# Patient Record
Sex: MALE | Race: WHITE | Employment: OTHER | URBAN - NONMETROPOLITAN AREA
[De-identification: names, ages, dates, MRNs, and addresses within clinical notes are randomized per-mention and may not be internally consistent; named-entity substitution may affect disease eponyms.]

---

## 2017-01-03 ENCOUNTER — HOSPITAL ENCOUNTER (OUTPATIENT)
Dept: GENERAL RADIOLOGY | Age: 77
Discharge: HOME | End: 2017-01-03
Attending: EMERGENCY MEDICINE | Admitting: EMERGENCY MEDICINE

## 2017-01-03 DIAGNOSIS — J40 BRONCHITIS, NOT SPECIFIED AS ACUTE OR CHRONIC: ICD-10-CM

## 2017-07-10 ENCOUNTER — HOSPITAL ENCOUNTER (OUTPATIENT)
Dept: SURGERY | Age: 77
Discharge: HOME | End: 2017-07-10
Attending: INTERNAL MEDICINE | Admitting: INTERNAL MEDICINE

## 2017-07-10 VITALS
DIASTOLIC BLOOD PRESSURE: 73 MMHG | TEMPERATURE: 98.6 F | RESPIRATION RATE: 16 BRPM | SYSTOLIC BLOOD PRESSURE: 121 MMHG | HEIGHT: 70 IN | OXYGEN SATURATION: 99 % | HEART RATE: 69 BPM | BODY MASS INDEX: 27.2 KG/M2 | WEIGHT: 190 LBS

## 2017-07-10 RX ORDER — SODIUM CHLORIDE, SODIUM LACTATE, POTASSIUM CHLORIDE, CALCIUM CHLORIDE 600; 310; 30; 20 MG/100ML; MG/100ML; MG/100ML; MG/100ML
INJECTION, SOLUTION INTRAVENOUS CONTINUOUS
Status: DISCONTINUED | OUTPATIENT
Start: 2017-07-10 | End: 2017-07-11 | Stop reason: HOSPADM

## 2017-07-10 RX ADMIN — SODIUM CHLORIDE, SODIUM LACTATE, POTASSIUM CHLORIDE, CALCIUM CHLORIDE: 600; 310; 30; 20 INJECTION, SOLUTION INTRAVENOUS at 08:04

## 2017-07-10 ASSESSMENT — PAIN - FUNCTIONAL ASSESSMENT: PAIN_FUNCTIONAL_ASSESSMENT: 0-10

## 2017-10-14 ENCOUNTER — HOSPITAL ENCOUNTER (OUTPATIENT)
Dept: LAB | Age: 77
Discharge: HOME | End: 2017-10-14
Attending: NURSE PRACTITIONER | Admitting: NURSE PRACTITIONER

## 2017-10-14 LAB
ALT SERPL-CCNC: 24 U/L (ref 10–40)
ANION GAP SERPL CALCULATED.3IONS-SCNC: 13 MMOL/L (ref 4–16)
BASOPHILS ABSOLUTE: 0.1 K/CU MM
BASOPHILS RELATIVE PERCENT: 0.9 % (ref 0–1)
BUN BLDV-MCNC: 28 MG/DL (ref 6–23)
CALCIUM SERPL-MCNC: 9.8 MG/DL (ref 8.3–10.6)
CHLORIDE BLD-SCNC: 102 MMOL/L (ref 99–110)
CHOLESTEROL: 206 MG/DL
CO2: 26 MMOL/L (ref 21–32)
CREAT SERPL-MCNC: 1.1 MG/DL (ref 0.9–1.3)
DIFFERENTIAL TYPE: ABNORMAL
EOSINOPHILS ABSOLUTE: 0.3 K/CU MM
EOSINOPHILS RELATIVE PERCENT: 5.7 % (ref 0–3)
GFR AFRICAN AMERICAN: >60 ML/MIN/1.73M2
GFR NON-AFRICAN AMERICAN: >60 ML/MIN/1.73M2
GLUCOSE BLD-MCNC: 111 MG/DL (ref 70–140)
HCT VFR BLD CALC: 43.7 % (ref 42–52)
HDLC SERPL-MCNC: 30 MG/DL
HEMOGLOBIN: 14 GM/DL (ref 13.5–18)
IMMATURE NEUTROPHIL %: 0.5 % (ref 0–0.43)
LDL CHOLESTEROL CALCULATED: ABNORMAL MG/DL
LYMPHOCYTES ABSOLUTE: 1.9 K/CU MM
LYMPHOCYTES RELATIVE PERCENT: 34.1 % (ref 24–44)
MCH RBC QN AUTO: 28 PG (ref 27–31)
MCHC RBC AUTO-ENTMCNC: 32 % (ref 32–36)
MCV RBC AUTO: 87.4 FL (ref 78–100)
MONOCYTES ABSOLUTE: 0.5 K/CU MM
MONOCYTES RELATIVE PERCENT: 9.1 % (ref 0–4)
PDW BLD-RTO: 14.6 % (ref 11.7–14.9)
PLATELET # BLD: 272 K/CU MM (ref 140–440)
PMV BLD AUTO: 8.9 FL (ref 7.5–11.1)
POTASSIUM SERPL-SCNC: 4.8 MMOL/L (ref 3.5–5.1)
PROSTATE SPECIFIC ANTIGEN: 1.64 NG/ML (ref 0–4)
RBC # BLD: 5 M/CU MM (ref 4.6–6.2)
SEGMENTED NEUTROPHILS ABSOLUTE COUNT: 2.8 K/CU MM
SEGMENTED NEUTROPHILS RELATIVE PERCENT: 49.7 % (ref 36–66)
SODIUM BLD-SCNC: 141 MMOL/L (ref 135–145)
TOTAL IMMATURE NEUTOROPHIL: 0.03 K/CU MM
TRIGL SERPL-MCNC: 422 MG/DL
TSH HIGH SENSITIVITY: 0.42 UIU/ML (ref 0.27–4.2)
WBC # BLD: 5.6 K/CU MM (ref 4–10.5)

## 2017-12-11 PROBLEM — I10 ESSENTIAL HYPERTENSION: Chronic | Status: ACTIVE | Noted: 2017-12-11

## 2017-12-11 PROBLEM — R55 SYNCOPE AND COLLAPSE: Status: ACTIVE | Noted: 2017-12-11

## 2017-12-11 PROBLEM — F32.A DEPRESSION: Chronic | Status: ACTIVE | Noted: 2017-12-11

## 2017-12-11 PROBLEM — N17.9 AKI (ACUTE KIDNEY INJURY) (HCC): Status: ACTIVE | Noted: 2017-12-11

## 2017-12-12 PROBLEM — R55 SYNCOPE AND COLLAPSE: Status: RESOLVED | Noted: 2017-12-11 | Resolved: 2017-12-12

## 2017-12-12 PROBLEM — I50.32 CHRONIC DIASTOLIC HEART FAILURE (HCC): Chronic | Status: ACTIVE | Noted: 2017-12-12

## 2017-12-12 PROBLEM — R22.1 MASS OF RIGHT SIDE OF NECK: Status: ACTIVE | Noted: 2017-12-12

## 2017-12-12 PROBLEM — N17.9 AKI (ACUTE KIDNEY INJURY) (HCC): Status: RESOLVED | Noted: 2017-12-11 | Resolved: 2017-12-12

## 2018-01-02 ENCOUNTER — TELEPHONE (OUTPATIENT)
Dept: CARDIOLOGY CLINIC | Age: 78
End: 2018-01-02

## 2018-01-02 ENCOUNTER — OFFICE VISIT (OUTPATIENT)
Dept: CARDIOLOGY CLINIC | Age: 78
End: 2018-01-02

## 2018-01-02 VITALS
HEART RATE: 92 BPM | DIASTOLIC BLOOD PRESSURE: 50 MMHG | BODY MASS INDEX: 29.06 KG/M2 | SYSTOLIC BLOOD PRESSURE: 100 MMHG | RESPIRATION RATE: 16 BRPM | HEIGHT: 70 IN | WEIGHT: 203 LBS

## 2018-01-02 DIAGNOSIS — I10 ESSENTIAL HYPERTENSION: Chronic | ICD-10-CM

## 2018-01-02 DIAGNOSIS — E78.1 HYPERTRIGLYCERIDEMIA: ICD-10-CM

## 2018-01-02 DIAGNOSIS — I50.32 CHRONIC DIASTOLIC HEART FAILURE (HCC): Primary | Chronic | ICD-10-CM

## 2018-01-02 PROCEDURE — 99214 OFFICE O/P EST MOD 30 MIN: CPT | Performed by: INTERNAL MEDICINE

## 2018-01-02 RX ORDER — GLUCOSAMINE/MSM/CHONDROITIN A 500-83-400
100 TABLET ORAL NIGHTLY
COMMUNITY

## 2018-01-02 RX ORDER — OMEGA-3-ACID ETHYL ESTERS 1 G/1
2 CAPSULE, LIQUID FILLED ORAL 2 TIMES DAILY
Qty: 360 CAPSULE | Refills: 3 | Status: SHIPPED | OUTPATIENT
Start: 2018-01-02 | End: 2018-04-09 | Stop reason: ALTCHOICE

## 2018-01-02 RX ORDER — GEMFIBROZIL 600 MG/1
600 TABLET, FILM COATED ORAL
Qty: 180 TABLET | Refills: 3 | Status: SHIPPED | OUTPATIENT
Start: 2018-01-02 | End: 2019-03-19 | Stop reason: SDUPTHER

## 2018-01-02 NOTE — ASSESSMENT & PLAN NOTE
Continue current medications and continue to monitor. Patient has been educated with reference range of normal blood pressure readings.

## 2018-01-02 NOTE — PROGRESS NOTES
CARDIOLOGY CONSULTATION    Shaq Mackey  1940      Dear Dr. Jonh Regan MD    Thank you for asking me to participate in care of Shaq Mackey    History of present illness:  Shaq Mackey is a 81-year-old male 14 seeing me for the first time for severe hypertriglyceridemia. He was recently admitted with syncope which was thought to be due to dehydration. He has chronic hypertension on medications and the diuretics were taken off of his regimen and lisinopril was increased from 10-20 mg daily. Patient has been monitoring his blood pressure at home has been running anywhere from 817 to 752 systolic and today in our office as a lowest he has noticed since his discharge on December 12. 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 He used to drink quite a bit of alcohol up until last admission and he has stopped drinking alcohol since he was told that his triglycerides are slightly high and that is probably the cause of it. His dehydration was also probably the cause of excessive drinking of alcohol and diuresis. He denies any lightheadedness or dizziness. He denies any chest pain, palpitations, or recurrence of syncope or near syncope. Hospital records are reviewed. He does stress test on December 11 negative for angina or electrocardiographic evidence of ischemia. Echocardiogram showed mild concentric left ventricular hypertrophy with normal systolic function.   REVIEW OF SYSTEMS:   Constitutional:  denies generalized weakness  Eyes:  Denies change in visual acuity  HENT:  Denies nasal congestion or sore throat  Respiratory:  Denies cough or shortness of breath  Cardiovascular: as in HPI  GI:  Denies abdominal pain, complains of nausea and vomiting  :  Denies dysuria  Musculoskeletal:  Denies back pain or joint pain  Integument:  Denies rash  Neurologic:  Denies headache, focal weakness or sensory changes  \"Remaining review of minutes 8 seconds on Ricardo Protocol Raheem 4.6   Stopped due to leg pain. ECG portion of stress test is negative for ischemia by diagnostic criteria. Submaximal ECG stress test due to inadequate HR response limited by symptoms   reported. Assessment / Recommendations:    Hypertriglyceridemia  Stop Lipitor and increase fish oil to 2 g twice a day and continue Lopid 600 mg twice a day. We will repeat lipids in 3 months. Patient is counseled to refrain from alcohol use and excessive consumption of sweets. Essential hypertension  Continue current medications and continue to monitor. Patient has been educated with reference range of normal blood pressure readings. Chronic diastolic heart failure (HCC)  Clinically stable and not in heart failure. We'll continue to optimize blood pressure therapy. Discontinue Lipitor and increase fish oil to 2 grams twice daily. Repeat lipids in 3 months. Refrain from alcohol and minimize sweets. Continue other current cardiovascular medications which have been reviewed and discussed individually with you. Patient is counseled for low cholesterol, low sodium and low fat diet. Also counseled for increase in fresh fruits and vegetables and healthy lifestyles. Follow up in office in 3 months,.  Multiple questions answered. Patient verbalizes understanding and asks relevant questions. Natasha Barrett MD, 1/2/2018 3:52 PM     Please note this report has been produced using speech recognition software and may contain errors related to that system including errors in grammar, punctuation, and spelling, as well as words and phrases that may be inappropriate.  If there are any questions or concerns please feel free to contact the dictating provider for clarification

## 2018-01-09 NOTE — TELEPHONE ENCOUNTER
Received faxed denial letter from pt's drug coverage insurance plan on pt's [employer] prior auth. Scanned into pt's Epic chart.

## 2018-04-09 ENCOUNTER — OFFICE VISIT (OUTPATIENT)
Dept: CARDIOLOGY CLINIC | Age: 78
End: 2018-04-09

## 2018-04-09 VITALS
WEIGHT: 204 LBS | DIASTOLIC BLOOD PRESSURE: 64 MMHG | RESPIRATION RATE: 16 BRPM | SYSTOLIC BLOOD PRESSURE: 114 MMHG | HEIGHT: 70 IN | HEART RATE: 72 BPM | BODY MASS INDEX: 29.2 KG/M2

## 2018-04-09 DIAGNOSIS — E78.1 HYPERTRIGLYCERIDEMIA: ICD-10-CM

## 2018-04-09 DIAGNOSIS — I50.32 CHRONIC DIASTOLIC HEART FAILURE (HCC): Chronic | ICD-10-CM

## 2018-04-09 DIAGNOSIS — I10 ESSENTIAL HYPERTENSION: Primary | Chronic | ICD-10-CM

## 2018-04-09 PROCEDURE — G8427 DOCREV CUR MEDS BY ELIG CLIN: HCPCS | Performed by: INTERNAL MEDICINE

## 2018-04-09 PROCEDURE — 1036F TOBACCO NON-USER: CPT | Performed by: INTERNAL MEDICINE

## 2018-04-09 PROCEDURE — 4040F PNEUMOC VAC/ADMIN/RCVD: CPT | Performed by: INTERNAL MEDICINE

## 2018-04-09 PROCEDURE — 99213 OFFICE O/P EST LOW 20 MIN: CPT | Performed by: INTERNAL MEDICINE

## 2018-04-09 PROCEDURE — 3017F COLORECTAL CA SCREEN DOC REV: CPT | Performed by: INTERNAL MEDICINE

## 2018-04-09 PROCEDURE — G8419 CALC BMI OUT NRM PARAM NOF/U: HCPCS | Performed by: INTERNAL MEDICINE

## 2018-04-09 PROCEDURE — 1123F ACP DISCUSS/DSCN MKR DOCD: CPT | Performed by: INTERNAL MEDICINE

## 2018-04-09 RX ORDER — ACETAMINOPHEN 500 MG
1000 TABLET ORAL 2 TIMES DAILY
COMMUNITY

## 2018-04-09 RX ORDER — CHLORAL HYDRATE 500 MG
2000 CAPSULE ORAL 2 TIMES DAILY
COMMUNITY

## 2018-04-14 ENCOUNTER — HOSPITAL ENCOUNTER (OUTPATIENT)
Dept: LAB | Age: 78
Discharge: HOME | End: 2018-04-14
Attending: INTERNAL MEDICINE | Admitting: INTERNAL MEDICINE

## 2018-04-14 LAB
CHOLESTEROL, FASTING: 237 MG/DL
HDLC SERPL-MCNC: 30 MG/DL
LDL CHOLESTEROL DIRECT: 169 MG/DL
TRIGLYCERIDE, FASTING: 268 MG/DL

## 2018-07-14 ENCOUNTER — HOSPITAL ENCOUNTER (OUTPATIENT)
Dept: LAB | Age: 78
Discharge: HOME | End: 2018-07-14
Attending: EMERGENCY MEDICINE | Admitting: EMERGENCY MEDICINE

## 2018-07-14 LAB
ALBUMIN SERPL-MCNC: 4.5 GM/DL (ref 3.4–5)
ALP BLD-CCNC: 60 IU/L (ref 40–129)
ALT SERPL-CCNC: 30 U/L (ref 10–40)
ANION GAP SERPL CALCULATED.3IONS-SCNC: 18 MMOL/L (ref 4–16)
AST SERPL-CCNC: 22 IU/L (ref 15–37)
BILIRUB SERPL-MCNC: 0.3 MG/DL (ref 0–1)
BUN BLDV-MCNC: 20 MG/DL (ref 6–23)
CALCIUM SERPL-MCNC: 9.6 MG/DL (ref 8.3–10.6)
CHLORIDE BLD-SCNC: 102 MMOL/L (ref 99–110)
CO2: 21 MMOL/L (ref 21–32)
CREAT SERPL-MCNC: 1 MG/DL (ref 0.9–1.3)
GFR AFRICAN AMERICAN: >60 ML/MIN/1.73M2
GFR NON-AFRICAN AMERICAN: >60 ML/MIN/1.73M2
GLUCOSE FASTING: 100 MG/DL (ref 70–99)
POTASSIUM SERPL-SCNC: 4.1 MMOL/L (ref 3.5–5.1)
SODIUM BLD-SCNC: 141 MMOL/L (ref 135–145)
TOTAL PROTEIN: 7 GM/DL (ref 6.4–8.2)

## 2018-10-04 ENCOUNTER — HOSPITAL ENCOUNTER (OUTPATIENT)
Age: 78
Discharge: HOME | End: 2018-10-04
Payer: MEDICARE

## 2018-10-04 DIAGNOSIS — E78.1 HYPERTRIGLYCERIDEMIA: ICD-10-CM

## 2018-10-04 LAB
CHOLESTEROL: 264 MG/DL
HDLC SERPL-MCNC: 40 MG/DL
LDL CHOLESTEROL DIRECT: 203 MG/DL
TRIGL SERPL-MCNC: 183 MG/DL

## 2018-10-04 PROCEDURE — 80061 LIPID PANEL: CPT

## 2018-10-04 PROCEDURE — 83721 ASSAY OF BLOOD LIPOPROTEIN: CPT

## 2018-10-04 PROCEDURE — 36415 COLL VENOUS BLD VENIPUNCTURE: CPT

## 2018-10-08 ENCOUNTER — OFFICE VISIT (OUTPATIENT)
Dept: CARDIOLOGY CLINIC | Age: 78
End: 2018-10-08
Payer: MEDICARE

## 2018-10-08 VITALS
DIASTOLIC BLOOD PRESSURE: 68 MMHG | SYSTOLIC BLOOD PRESSURE: 134 MMHG | HEIGHT: 70 IN | RESPIRATION RATE: 16 BRPM | WEIGHT: 197 LBS | BODY MASS INDEX: 28.2 KG/M2 | HEART RATE: 76 BPM

## 2018-10-08 DIAGNOSIS — E78.1 HYPERTRIGLYCERIDEMIA: ICD-10-CM

## 2018-10-08 DIAGNOSIS — I50.32 CHRONIC DIASTOLIC HEART FAILURE (HCC): Chronic | ICD-10-CM

## 2018-10-08 DIAGNOSIS — I10 ESSENTIAL HYPERTENSION: Primary | Chronic | ICD-10-CM

## 2018-10-08 PROCEDURE — 99214 OFFICE O/P EST MOD 30 MIN: CPT | Performed by: INTERNAL MEDICINE

## 2018-10-08 PROCEDURE — G8419 CALC BMI OUT NRM PARAM NOF/U: HCPCS | Performed by: INTERNAL MEDICINE

## 2018-10-08 PROCEDURE — G8484 FLU IMMUNIZE NO ADMIN: HCPCS | Performed by: INTERNAL MEDICINE

## 2018-10-08 PROCEDURE — 4040F PNEUMOC VAC/ADMIN/RCVD: CPT | Performed by: INTERNAL MEDICINE

## 2018-10-08 PROCEDURE — 1101F PT FALLS ASSESS-DOCD LE1/YR: CPT | Performed by: INTERNAL MEDICINE

## 2018-10-08 PROCEDURE — 1036F TOBACCO NON-USER: CPT | Performed by: INTERNAL MEDICINE

## 2018-10-08 PROCEDURE — 3017F COLORECTAL CA SCREEN DOC REV: CPT | Performed by: INTERNAL MEDICINE

## 2018-10-08 PROCEDURE — 1123F ACP DISCUSS/DSCN MKR DOCD: CPT | Performed by: INTERNAL MEDICINE

## 2018-10-08 PROCEDURE — G8427 DOCREV CUR MEDS BY ELIG CLIN: HCPCS | Performed by: INTERNAL MEDICINE

## 2018-10-08 NOTE — PROGRESS NOTES
Zoe Astudillo  1940  Jair Ornelas MD    Chief complaint and HPI:  Zoe Astudillo  46-year-old male follows up for hypertension, hyperlipidemia and history of diastolic heart failure. He denies any chest pain, shortness of breath, orthopnea. He denies any palpitations, leg swelling, syncope or near syncope. He has been compliant to medications he was not able to tolerate Lipitor due to severe hip and knee pain. 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 He is a former smoker. He monitors his blood pressure at home and it is around 120-130 mmHg. Rest of the Cardiovascular system review is otherwise unchanged from prior encounter. Past medical history:  has a past medical history of Esophageal varices ([address] H/O Doppler ultrasound; H/O echocardiogram; H/O exercise stress test; Hyperlipidemia; Hypertension; and Hypertriglyceridemia. Past surgical history:  has a past surgical history that includes knee surgery (Left); Cholecystectomy; Appendectomy; Cardiac catheterization; Colonoscopy; Colonoscopy (07/10/2017); Endoscopy, colon, diagnostic; and Endoscopy, colon, diagnostic (07/10/2017). Social History:   Social History   Substance Use Topics    Smoking status: Former Smoker    Smokeless tobacco: Former User    Alcohol use 9.0 oz/week     15 Shots of liquor per week      Comment: frequently     Family history: family history is not on file. ALLERGIES:  Lipitor [atorvastatin]  Prior to Admission medications    Medication Sig Start Date End Date Taking?  Authorizing Provider   pitavastatin (LIVALO) 2 MG TABS tablet Take 1 tablet by mouth nightly 10/8/18  Yes Erickson Palma MD   Omega-3 Fatty Acids (FISH OIL) 1000 MG CAPS Take 2,000 mg by mouth 2 times daily   Yes Historical Provider, MD   acetaminophen (TYLENOL) 500 MG tablet Take 500 mg by mouth as needed for Pain   Yes Historical Provider, MD   Coenzyme Q10 (COQ-10) 50 MG CAPS Take 50 mg by mouth daily   Yes Historical

## 2018-10-08 NOTE — ASSESSMENT & PLAN NOTE
Triglycerides improved however LDL is high reportedly 203. We will try Livalo 2 mg daily if tolerated. Repeat lipids and LFT in 4 weeks.

## 2018-10-09 ENCOUNTER — TELEPHONE (OUTPATIENT)
Dept: CARDIOLOGY CLINIC | Age: 78
End: 2018-10-09

## 2018-10-09 RX ORDER — SIMVASTATIN 20 MG
20 TABLET ORAL NIGHTLY
Qty: 30 TABLET | Refills: 3 | Status: SHIPPED | OUTPATIENT
Start: 2018-10-09 | End: 2018-11-21 | Stop reason: SDUPTHER

## 2018-10-09 NOTE — TELEPHONE ENCOUNTER
He can check with his insurance what other alternative they offer him as he has history of not tolerating Lipitor.

## 2018-10-09 NOTE — TELEPHONE ENCOUNTER
Patient can not afford Livalo and prior authorization was denied. Is there anything else you can prescribe for the patient?

## 2018-10-09 NOTE — TELEPHONE ENCOUNTER
Patient called back and stated he has tried Atorvastatin and Crestor in the past and he had side effects. Insurance company will not approve livalo but will approve statins. He stated Simvastatin would only cost him one dollar. Patient stated only other option was to change his insurance plan. Please advise.

## 2018-10-09 NOTE — TELEPHONE ENCOUNTER
I called patient to let him know to call his insurance company and find out which medication they would cover and to let us know.

## 2018-10-10 ENCOUNTER — TELEPHONE (OUTPATIENT)
Dept: CARDIOLOGY CLINIC | Age: 78
End: 2018-10-10

## 2018-11-07 ENCOUNTER — HOSPITAL ENCOUNTER (OUTPATIENT)
Age: 78
Discharge: HOME | End: 2018-11-07
Payer: MEDICARE

## 2018-11-07 DIAGNOSIS — E78.1 HYPERTRIGLYCERIDEMIA: ICD-10-CM

## 2018-11-07 LAB
ALBUMIN SERPL-MCNC: 4.8 GM/DL (ref 3.4–5)
ALP BLD-CCNC: 63 IU/L (ref 40–129)
ALT SERPL-CCNC: 25 U/L (ref 10–40)
ANION GAP SERPL CALCULATED.3IONS-SCNC: 7 MMOL/L (ref 4–16)
AST SERPL-CCNC: 16 IU/L (ref 15–37)
BILIRUB SERPL-MCNC: 0.2 MG/DL (ref 0–1)
BUN BLDV-MCNC: 29 MG/DL (ref 6–23)
CALCIUM SERPL-MCNC: 10.3 MG/DL (ref 8.3–10.6)
CHLORIDE BLD-SCNC: 111 MMOL/L (ref 99–110)
CHOLESTEROL: 166 MG/DL
CO2: 27 MMOL/L (ref 21–32)
CREAT SERPL-MCNC: 1 MG/DL (ref 0.9–1.3)
GFR AFRICAN AMERICAN: >60 ML/MIN/1.73M2
GFR NON-AFRICAN AMERICAN: >60 ML/MIN/1.73M2
GLUCOSE BLD-MCNC: 112 MG/DL (ref 70–99)
HDLC SERPL-MCNC: 41 MG/DL
LDL CHOLESTEROL DIRECT: 100 MG/DL
POTASSIUM SERPL-SCNC: 4.5 MMOL/L (ref 3.5–5.1)
SODIUM BLD-SCNC: 145 MMOL/L (ref 135–145)
TOTAL PROTEIN: 6.9 GM/DL (ref 6.4–8.2)
TRIGL SERPL-MCNC: 175 MG/DL

## 2018-11-07 PROCEDURE — 83721 ASSAY OF BLOOD LIPOPROTEIN: CPT

## 2018-11-07 PROCEDURE — 80053 COMPREHEN METABOLIC PANEL: CPT

## 2018-11-07 PROCEDURE — 36415 COLL VENOUS BLD VENIPUNCTURE: CPT

## 2018-11-07 PROCEDURE — 80061 LIPID PANEL: CPT

## 2018-11-21 RX ORDER — SIMVASTATIN 20 MG
20 TABLET ORAL NIGHTLY
Qty: 90 TABLET | Refills: 3 | Status: ON HOLD | OUTPATIENT
Start: 2018-11-21 | End: 2022-05-17 | Stop reason: HOSPADM

## 2019-01-15 PROBLEM — K64.8 BLEEDING INTERNAL HEMORRHOIDS: Status: ACTIVE | Noted: 2019-01-15

## 2019-01-22 ENCOUNTER — HOSPITAL ENCOUNTER (OUTPATIENT)
Age: 79
Discharge: HOME | End: 2019-01-22
Payer: MEDICARE

## 2019-01-22 LAB
EKG ATRIAL RATE: 70 BPM
EKG DIAGNOSIS: NORMAL
EKG P AXIS: 51 DEGREES
EKG P-R INTERVAL: 162 MS
EKG Q-T INTERVAL: 408 MS
EKG QRS DURATION: 122 MS
EKG QTC CALCULATION (BAZETT): 440 MS
EKG R AXIS: -61 DEGREES
EKG T AXIS: 36 DEGREES
EKG VENTRICULAR RATE: 70 BPM
HEMOGLOBIN: 11.6 GM/DL (ref 13.5–18)

## 2019-01-22 PROCEDURE — 93005 ELECTROCARDIOGRAM TRACING: CPT | Performed by: ANESTHESIOLOGY

## 2019-01-22 PROCEDURE — 85018 HEMOGLOBIN: CPT

## 2019-01-22 PROCEDURE — 93010 ELECTROCARDIOGRAM REPORT: CPT | Performed by: INTERNAL MEDICINE

## 2019-01-22 PROCEDURE — 36415 COLL VENOUS BLD VENIPUNCTURE: CPT

## 2019-02-06 PROBLEM — Z87.19 S/P HEMORRHOIDECTOMY: Status: ACTIVE | Noted: 2019-02-06

## 2019-02-06 PROBLEM — Z98.890 S/P HEMORRHOIDECTOMY: Status: ACTIVE | Noted: 2019-02-06

## 2019-03-19 RX ORDER — GEMFIBROZIL 600 MG/1
600 TABLET, FILM COATED ORAL
Qty: 180 TABLET | Refills: 3 | Status: SHIPPED | OUTPATIENT
Start: 2019-03-19 | End: 2019-05-21 | Stop reason: ALTCHOICE

## 2019-05-21 ENCOUNTER — OFFICE VISIT (OUTPATIENT)
Dept: CARDIOLOGY CLINIC | Age: 79
End: 2019-05-21
Payer: MEDICARE

## 2019-05-21 VITALS
HEIGHT: 70 IN | DIASTOLIC BLOOD PRESSURE: 74 MMHG | WEIGHT: 201 LBS | RESPIRATION RATE: 14 BRPM | HEART RATE: 84 BPM | SYSTOLIC BLOOD PRESSURE: 136 MMHG | BODY MASS INDEX: 28.77 KG/M2

## 2019-05-21 DIAGNOSIS — I10 ESSENTIAL HYPERTENSION: Primary | Chronic | ICD-10-CM

## 2019-05-21 DIAGNOSIS — E78.1 HYPERTRIGLYCERIDEMIA: ICD-10-CM

## 2019-05-21 DIAGNOSIS — I50.32 CHRONIC DIASTOLIC HEART FAILURE (HCC): Chronic | ICD-10-CM

## 2019-05-21 PROCEDURE — G8419 CALC BMI OUT NRM PARAM NOF/U: HCPCS | Performed by: INTERNAL MEDICINE

## 2019-05-21 PROCEDURE — 1036F TOBACCO NON-USER: CPT | Performed by: INTERNAL MEDICINE

## 2019-05-21 PROCEDURE — G8427 DOCREV CUR MEDS BY ELIG CLIN: HCPCS | Performed by: INTERNAL MEDICINE

## 2019-05-21 PROCEDURE — 1123F ACP DISCUSS/DSCN MKR DOCD: CPT | Performed by: INTERNAL MEDICINE

## 2019-05-21 PROCEDURE — 99214 OFFICE O/P EST MOD 30 MIN: CPT | Performed by: INTERNAL MEDICINE

## 2019-05-21 PROCEDURE — 4040F PNEUMOC VAC/ADMIN/RCVD: CPT | Performed by: INTERNAL MEDICINE

## 2019-05-21 NOTE — PROGRESS NOTES
Pennie Dance  1940  Leslie Goldsmith MD    Chief complaint and HPI:  Pennie Dance  72-year-old male follows up for hypertension and severe hyperlipidemia and history of congestive heart failure. His tolerating simvastatin 20 mg complains of some aches and pain in both knees which he believes this tolerable. He is also complaining of sciatica of right lower extremity. He is a former smoker. He denies any chest pain or palpitations or orthopnea or paroxysmal nocturnal dyspnea. He denies any leg swelling. His medications are reviewed. He checks his blood pressure at home usually it is between 623 and 097 systolic. Rest of the Cardiovascular system review is otherwise unchanged from prior encounter. Past medical history:  has a past medical history of Esophageal varices ([address] H/O Doppler ultrasound, H/O echocardiogram, H/O exercise stress test, Hyperlipidemia, Hypertension, and Hypertriglyceridemia. Past surgical history:  has a past surgical history that includes knee surgery (Left); Cholecystectomy; Appendectomy; Cardiac catheterization; Colonoscopy; Colonoscopy (07/10/2017); Endoscopy, colon, diagnostic; and Endoscopy, colon, diagnostic (07/10/2017). Social History:   Social History     Tobacco Use    Smoking status: Former Smoker    Smokeless tobacco: Former User   Substance Use Topics    Alcohol use: Yes     Alcohol/week: 9.0 oz     Types: 15 Shots of liquor per week     Comment: frequently     Family history: family history is not on file. ALLERGIES:  Lipitor [atorvastatin]  Prior to Admission medications    Medication Sig Start Date End Date Taking?  Authorizing Provider   simvastatin (ZOCOR) 20 MG tablet Take 1 tablet by mouth nightly 11/21/18  Yes Svitlana Page MD   Omega-3 Fatty Acids (FISH OIL) 1000 MG CAPS Take 2,000 mg by mouth 2 times daily   Yes Historical Provider, MD   acetaminophen (TYLENOL) 500 MG tablet Take 500 mg by mouth as needed for Pain   Yes Historical Provider, MD   Coenzyme Q10 (COQ-10) 50 MG CAPS Take 50 mg by mouth daily   Yes Historical Provider, MD   aspirin 81 MG chewable tablet Take 1 tablet by mouth daily 12/13/17  Yes Sherrill Tyler MD   vitamin E 400 UNIT capsule Take 400 Units by mouth daily   Yes Historical Provider, MD   esomeprazole Magnesium (NEXIUM) 20 MG PACK Take 40 mg by mouth 2 times daily    Yes Historical Provider, MD   lisinopril (PRINIVIL;ZESTRIL) 20 MG tablet Take 20 mg by mouth daily   Yes Historical Provider, MD   citalopram (CELEXA) 20 MG tablet Take 20 mg by mouth nightly    Yes Historical Provider, MD     Vitals:    05/21/19 1403   BP: 136/74   Site: Left Upper Arm   Position: Sitting   Cuff Size: Large Adult   Pulse: 84   Resp: 14   Weight: 201 lb (91.2 kg)   Height: 5' 10\" (1.778 m)      Body mass index is 28.84 kg/m². Wt Readings from Last 3 Encounters:   05/21/19 201 lb (91.2 kg)   02/06/19 205 lb (93 kg)   01/14/19 204 lb (92.5 kg)     Constitutional:  Normally built and nourished male in no apparent distress  Eyes:  Pupils are equal.  He wears glasses. No conjunctival pallor noted. NECK: No JVP or thyromegaly  Cardiovascular: Auscultation: Normal S1 and S2. No murmurs or gallops noted. .  Carotids are negative for bruits. .  Abdominal aorta is nonpalpable. No epigastric bruit noted. Peripheral pulses: 2+ equal in both dorsalis pedis and posterior tibial locations. Respiratory:  Respiratory effort is normal.  Breath sounds are clear to auscultation. Extremities:  No edema, clubbing,  Cyanosis, petechiae. SKIN: Warm and well perfused, no pallor or cyanosis  Abdomen:  No masses or tenderness. No organomegaly noted. Musculoskeletal:  No obvious spinal deformities noted. Gait is steady. Muscle strength is normal.  Neurologic:  Oriented to time, place, and person and non-anxious. No focal neurological deficit noted. Psychiatric: Normal mood and effect.      LAB REVIEW:    CBC:   Lab Results   Component Value Date WBC 7.6 07/01/2018    HGB 11.6 01/22/2019    HCT 38.2 07/01/2018     07/01/2018     Lipids:   Lab Results   Component Value Date    CHOL 166 11/07/2018    TRIG 175 (H) 11/07/2018    HDL 41 11/07/2018    LDLCALC NOT VALID WHEN TRIGLYCERIDE >400 MG/DL. 10/14/2017    LDLDIRECT 100 (H) 11/07/2018     Renal:   Lab Results   Component Value Date    BUN 29 11/07/2018    CREATININE 1.0 11/07/2018     11/07/2018    K 4.5 11/07/2018     PT/INR:   Lab Results   Component Value Date    INR 1.17 07/01/2018     IMPRESSION and RECOMMENDATIONS:      Hypertriglyceridemia  Improved on the simvastatin 20 mg a day. Continue the same. Essential hypertension  Well-controlled on current medications. Continue the same. Chronic diastolic heart failure (HCC)  Clinically stable and not in heart failure. Continue optimal antihypertensive therapy. Continue current cardiovascular medications which have been reviewed and discussed individually with you. Primary prevention is the goal by aggressive risk modification, healthy and therapeutic life style changes for cardiovascular risk reduction. Various goals are discussed and questions answered. Appropriate prescriptions if needed on this visit are addressed. After visit summery is provided. Questions answered and patient verbalizes understanding. Follow up in office in 12 months with ECG, sooner if needed. Deandra Hammer MD, 5/21/2019 2:28 PM     Please note this report has been partially produced using speech recognition software and may contain errors related to that system including errors in grammar, punctuation, and spelling, as well as words and phrases that may be inappropriate. If there are any questions or concerns please feel free to contact the dictating provider for clarification.

## 2019-05-25 ENCOUNTER — HOSPITAL ENCOUNTER (OUTPATIENT)
Age: 79
Discharge: HOME | End: 2019-05-25
Payer: MEDICARE

## 2019-05-25 DIAGNOSIS — E78.1 HYPERTRIGLYCERIDEMIA: ICD-10-CM

## 2019-05-25 LAB
ALBUMIN SERPL-MCNC: 5 GM/DL (ref 3.4–5)
ALP BLD-CCNC: 69 IU/L (ref 40–129)
ALT SERPL-CCNC: 30 U/L (ref 10–40)
ANION GAP SERPL CALCULATED.3IONS-SCNC: 6 MMOL/L (ref 4–16)
AST SERPL-CCNC: 22 IU/L (ref 15–37)
BILIRUB SERPL-MCNC: 0.4 MG/DL (ref 0–1)
BUN BLDV-MCNC: 24 MG/DL (ref 6–23)
CALCIUM SERPL-MCNC: 9.8 MG/DL (ref 8.3–10.6)
CHLORIDE BLD-SCNC: 106 MMOL/L (ref 99–110)
CHOLESTEROL: 189 MG/DL
CO2: 32 MMOL/L (ref 21–32)
CREAT SERPL-MCNC: 0.8 MG/DL (ref 0.9–1.3)
GFR AFRICAN AMERICAN: >60 ML/MIN/1.73M2
GFR NON-AFRICAN AMERICAN: >60 ML/MIN/1.73M2
GLUCOSE BLD-MCNC: 114 MG/DL (ref 70–99)
HDLC SERPL-MCNC: 40 MG/DL
LDL CHOLESTEROL DIRECT: 141 MG/DL
POTASSIUM SERPL-SCNC: 4.4 MMOL/L (ref 3.5–5.1)
SODIUM BLD-SCNC: 144 MMOL/L (ref 135–145)
TOTAL CK: 91 IU/L (ref 38–174)
TOTAL PROTEIN: 7.5 GM/DL (ref 6.4–8.2)
TRIGL SERPL-MCNC: 216 MG/DL

## 2019-05-25 PROCEDURE — 83721 ASSAY OF BLOOD LIPOPROTEIN: CPT

## 2019-05-25 PROCEDURE — 36415 COLL VENOUS BLD VENIPUNCTURE: CPT

## 2019-05-25 PROCEDURE — 80053 COMPREHEN METABOLIC PANEL: CPT

## 2019-05-25 PROCEDURE — 82550 ASSAY OF CK (CPK): CPT

## 2019-05-25 PROCEDURE — 80061 LIPID PANEL: CPT

## 2020-01-30 ENCOUNTER — HOSPITAL ENCOUNTER (OUTPATIENT)
Age: 80
Discharge: HOME | End: 2020-01-30
Payer: MEDICARE

## 2020-01-30 LAB
ALT SERPL-CCNC: 22 U/L (ref 10–40)
ANION GAP SERPL CALCULATED.3IONS-SCNC: 13 MMOL/L (ref 4–16)
BASOPHILS ABSOLUTE: 0 K/CU MM
BASOPHILS RELATIVE PERCENT: 0.9 % (ref 0–1)
BUN BLDV-MCNC: 22 MG/DL (ref 6–23)
CALCIUM SERPL-MCNC: 9.9 MG/DL (ref 8.3–10.6)
CHLORIDE BLD-SCNC: 104 MMOL/L (ref 99–110)
CHOLESTEROL, FASTING: 231 MG/DL
CO2: 26 MMOL/L (ref 21–32)
CREAT SERPL-MCNC: 1 MG/DL (ref 0.9–1.3)
DIFFERENTIAL TYPE: ABNORMAL
EOSINOPHILS ABSOLUTE: 0.4 K/CU MM
EOSINOPHILS RELATIVE PERCENT: 7.9 % (ref 0–3)
GFR AFRICAN AMERICAN: >60 ML/MIN/1.73M2
GFR NON-AFRICAN AMERICAN: >60 ML/MIN/1.73M2
GLUCOSE FASTING: 120 MG/DL (ref 70–99)
HCT VFR BLD CALC: 42.4 % (ref 42–52)
HDLC SERPL-MCNC: 32 MG/DL
HEMOGLOBIN: 13.9 GM/DL (ref 13.5–18)
IMMATURE NEUTROPHIL %: 0.4 % (ref 0–0.43)
LDL CHOLESTEROL DIRECT: 180 MG/DL
LYMPHOCYTES ABSOLUTE: 1.6 K/CU MM
LYMPHOCYTES RELATIVE PERCENT: 36.2 % (ref 24–44)
MCH RBC QN AUTO: 30.2 PG (ref 27–31)
MCHC RBC AUTO-ENTMCNC: 32.8 % (ref 32–36)
MCV RBC AUTO: 92.2 FL (ref 78–100)
MONOCYTES ABSOLUTE: 0.5 K/CU MM
MONOCYTES RELATIVE PERCENT: 11 % (ref 0–4)
PDW BLD-RTO: 13.7 % (ref 11.7–14.9)
PLATELET # BLD: 240 K/CU MM (ref 140–440)
PMV BLD AUTO: 9.6 FL (ref 7.5–11.1)
POTASSIUM SERPL-SCNC: 4.3 MMOL/L (ref 3.5–5.1)
PROSTATE SPECIFIC ANTIGEN: 1.24 NG/ML (ref 0–4)
RBC # BLD: 4.6 M/CU MM (ref 4.6–6.2)
SEGMENTED NEUTROPHILS ABSOLUTE COUNT: 1.9 K/CU MM
SEGMENTED NEUTROPHILS RELATIVE PERCENT: 43.6 % (ref 36–66)
SODIUM BLD-SCNC: 143 MMOL/L (ref 135–145)
TOTAL IMMATURE NEUTOROPHIL: 0.02 K/CU MM
TRIGLYCERIDE, FASTING: 238 MG/DL
TSH HIGH SENSITIVITY: 0.53 UIU/ML (ref 0.27–4.2)
WBC # BLD: 4.5 K/CU MM (ref 4–10.5)

## 2020-01-30 PROCEDURE — 85025 COMPLETE CBC W/AUTO DIFF WBC: CPT

## 2020-01-30 PROCEDURE — 84460 ALANINE AMINO (ALT) (SGPT): CPT

## 2020-01-30 PROCEDURE — 83036 HEMOGLOBIN GLYCOSYLATED A1C: CPT

## 2020-01-30 PROCEDURE — 84443 ASSAY THYROID STIM HORMONE: CPT

## 2020-01-30 PROCEDURE — G0103 PSA SCREENING: HCPCS

## 2020-01-30 PROCEDURE — 36415 COLL VENOUS BLD VENIPUNCTURE: CPT

## 2020-01-30 PROCEDURE — 80061 LIPID PANEL: CPT

## 2020-01-30 PROCEDURE — 80048 BASIC METABOLIC PNL TOTAL CA: CPT

## 2020-02-03 ENCOUNTER — HOSPITAL ENCOUNTER (OUTPATIENT)
Age: 80
Setting detail: SPECIMEN
Discharge: HOME | End: 2020-02-03
Payer: MEDICARE

## 2020-02-03 LAB
ESTIMATED AVERAGE GLUCOSE: 117 MG/DL
HBA1C MFR BLD: 5.7 % (ref 4.2–6.3)

## 2020-03-18 PROBLEM — C44.519 BCC (BASAL CELL CARCINOMA), CHEST: Status: ACTIVE | Noted: 2020-03-18

## 2020-06-17 PROBLEM — Z09 S/P EXCISION OF SKIN LESION, FOLLOW-UP EXAM: Status: ACTIVE | Noted: 2020-06-17

## 2020-07-17 PROBLEM — Z09 S/P EXCISION OF SKIN LESION, FOLLOW-UP EXAM: Status: RESOLVED | Noted: 2020-06-17 | Resolved: 2020-07-17

## 2021-01-14 ENCOUNTER — HOSPITAL ENCOUNTER (OUTPATIENT)
Age: 81
Discharge: HOME | End: 2021-01-14
Payer: MEDICARE

## 2021-01-14 LAB
ALT SERPL-CCNC: 46 U/L (ref 10–40)
ANION GAP SERPL CALCULATED.3IONS-SCNC: 12 MMOL/L (ref 4–16)
BASOPHILS ABSOLUTE: 0.1 K/CU MM
BASOPHILS RELATIVE PERCENT: 1.5 % (ref 0–1)
BUN BLDV-MCNC: 22 MG/DL (ref 6–23)
CALCIUM SERPL-MCNC: 9.5 MG/DL (ref 8.3–10.6)
CHLORIDE BLD-SCNC: 104 MMOL/L (ref 99–110)
CHOLESTEROL, FASTING: 242 MG/DL
CO2: 23 MMOL/L (ref 21–32)
CREAT SERPL-MCNC: 1 MG/DL (ref 0.9–1.3)
DIFFERENTIAL TYPE: ABNORMAL
EOSINOPHILS ABSOLUTE: 0.3 K/CU MM
EOSINOPHILS RELATIVE PERCENT: 6.2 % (ref 0–3)
GFR AFRICAN AMERICAN: >60 ML/MIN/1.73M2
GFR NON-AFRICAN AMERICAN: >60 ML/MIN/1.73M2
GLUCOSE FASTING: 82 MG/DL (ref 70–99)
HCT VFR BLD CALC: 39.9 % (ref 42–52)
HDLC SERPL-MCNC: 29 MG/DL
HEMOGLOBIN: 13.5 GM/DL (ref 13.5–18)
IMMATURE NEUTROPHIL %: 0.4 % (ref 0–0.43)
LDL CHOLESTEROL DIRECT: 165 MG/DL
LYMPHOCYTES ABSOLUTE: 1.9 K/CU MM
LYMPHOCYTES RELATIVE PERCENT: 39.5 % (ref 24–44)
MCH RBC QN AUTO: 30.9 PG (ref 27–31)
MCHC RBC AUTO-ENTMCNC: 33.8 % (ref 32–36)
MCV RBC AUTO: 91.3 FL (ref 78–100)
MONOCYTES ABSOLUTE: 0.5 K/CU MM
MONOCYTES RELATIVE PERCENT: 10.6 % (ref 0–4)
PDW BLD-RTO: 13 % (ref 11.7–14.9)
PLATELET # BLD: 226 K/CU MM (ref 140–440)
PMV BLD AUTO: 9.7 FL (ref 7.5–11.1)
POTASSIUM SERPL-SCNC: 4.1 MMOL/L (ref 3.5–5.1)
PROSTATE SPECIFIC ANTIGEN: 1.27 NG/ML (ref 0–4)
RBC # BLD: 4.37 M/CU MM (ref 4.6–6.2)
SEGMENTED NEUTROPHILS ABSOLUTE COUNT: 2 K/CU MM
SEGMENTED NEUTROPHILS RELATIVE PERCENT: 41.8 % (ref 36–66)
SODIUM BLD-SCNC: 139 MMOL/L (ref 135–145)
TOTAL IMMATURE NEUTOROPHIL: 0.02 K/CU MM
TRIGLYCERIDE, FASTING: 354 MG/DL
TSH HIGH SENSITIVITY: 0.41 UIU/ML (ref 0.27–4.2)
WBC # BLD: 4.8 K/CU MM (ref 4–10.5)

## 2021-01-14 PROCEDURE — 84460 ALANINE AMINO (ALT) (SGPT): CPT

## 2021-01-14 PROCEDURE — 36415 COLL VENOUS BLD VENIPUNCTURE: CPT

## 2021-01-14 PROCEDURE — G0103 PSA SCREENING: HCPCS

## 2021-01-14 PROCEDURE — 80048 BASIC METABOLIC PNL TOTAL CA: CPT

## 2021-01-14 PROCEDURE — 80061 LIPID PANEL: CPT

## 2021-01-14 PROCEDURE — 85025 COMPLETE CBC W/AUTO DIFF WBC: CPT

## 2021-01-14 PROCEDURE — 84443 ASSAY THYROID STIM HORMONE: CPT

## 2021-06-02 ENCOUNTER — HOSPITAL ENCOUNTER (OUTPATIENT)
Age: 81
Discharge: HOME | End: 2021-06-02
Payer: MEDICARE

## 2021-06-02 LAB — HDLC SERPL-MCNC: 28 MG/DL

## 2021-06-02 PROCEDURE — 36415 COLL VENOUS BLD VENIPUNCTURE: CPT

## 2021-06-02 PROCEDURE — 83718 ASSAY OF LIPOPROTEIN: CPT

## 2021-06-24 ENCOUNTER — HOSPITAL ENCOUNTER (OUTPATIENT)
Age: 81
Discharge: HOME | End: 2021-06-24
Payer: MEDICARE

## 2021-06-24 PROCEDURE — 86038 ANTINUCLEAR ANTIBODIES: CPT

## 2021-06-24 PROCEDURE — 36415 COLL VENOUS BLD VENIPUNCTURE: CPT

## 2021-06-26 LAB — ANTI-NUCLEAR ANTIBODY (ANA): NORMAL

## 2021-12-03 ENCOUNTER — HOSPITAL ENCOUNTER (EMERGENCY)
Age: 81
Discharge: HOME | End: 2021-12-03
Attending: EMERGENCY MEDICINE
Payer: MEDICARE

## 2021-12-03 VITALS
OXYGEN SATURATION: 93 % | BODY MASS INDEX: 30.06 KG/M2 | HEART RATE: 86 BPM | DIASTOLIC BLOOD PRESSURE: 90 MMHG | SYSTOLIC BLOOD PRESSURE: 190 MMHG | WEIGHT: 210 LBS | RESPIRATION RATE: 18 BRPM | TEMPERATURE: 97.7 F | HEIGHT: 70 IN

## 2021-12-03 DIAGNOSIS — H65.00 ACUTE SEROUS OTITIS MEDIA, RECURRENCE NOT SPECIFIED, UNSPECIFIED LATERALITY: Primary | ICD-10-CM

## 2021-12-03 PROCEDURE — 99283 EMERGENCY DEPT VISIT LOW MDM: CPT

## 2021-12-03 RX ORDER — LORATADINE 10 MG/1
10 TABLET ORAL DAILY
Qty: 10 TABLET | Refills: 0 | Status: ON HOLD | OUTPATIENT
Start: 2021-12-03 | End: 2022-05-13

## 2021-12-03 RX ORDER — AMOXICILLIN 500 MG/1
500 CAPSULE ORAL 2 TIMES DAILY
Qty: 20 CAPSULE | Refills: 0 | Status: SHIPPED | OUTPATIENT
Start: 2021-12-03 | End: 2021-12-13

## 2021-12-03 ASSESSMENT — ENCOUNTER SYMPTOMS
RHINORRHEA: 1
COUGH: 1
SORE THROAT: 1
EYES NEGATIVE: 1
GASTROINTESTINAL NEGATIVE: 1

## 2021-12-03 ASSESSMENT — PAIN DESCRIPTION - ORIENTATION: ORIENTATION: LEFT

## 2021-12-03 ASSESSMENT — PAIN DESCRIPTION - LOCATION: LOCATION: EAR

## 2021-12-03 ASSESSMENT — PAIN SCALES - GENERAL: PAINLEVEL_OUTOF10: 6

## 2021-12-03 ASSESSMENT — PAIN DESCRIPTION - PAIN TYPE: TYPE: ACUTE PAIN

## 2021-12-03 NOTE — ED PROVIDER NOTES
The history is provided by the patient. Ear Problem  Location:  Bilateral  Behind ear:  No abnormality  Quality:  Aching, dull and pressure  Severity:  Moderate  Onset quality:  Sudden  Timing:  Constant  Progression:  Worsening  Chronicity:  New  Context: recent URI    Relieved by:  Nothing  Worsened by:  Nothing  Ineffective treatments:  None tried  Associated symptoms: congestion, cough, rhinorrhea and sore throat    Associated symptoms: no fever        Review of Systems   Constitutional: Negative. Negative for fever. HENT: Positive for congestion, rhinorrhea and sore throat. Eyes: Negative. Respiratory: Positive for cough. Cardiovascular: Negative. Gastrointestinal: Negative. Genitourinary: Negative. Musculoskeletal: Negative. Skin: Negative. Neurological: Negative. All other systems reviewed and are negative. History reviewed. No pertinent family history. Social History     Socioeconomic History    Marital status:      Spouse name: Not on file    Number of children: Not on file    Years of education: Not on file    Highest education level: Not on file   Occupational History    Not on file   Tobacco Use    Smoking status: Former Smoker    Smokeless tobacco: Former User   Vaping Use    Vaping Use: Never used   Substance and Sexual Activity    Alcohol use: Yes     Alcohol/week: 4.0 standard drinks     Types: 4 Shots of liquor per week     Comment: frequently    Drug use: No    Sexual activity: Yes     Partners: Female   Other Topics Concern    Not on file   Social History Narrative    Not on file     Social Determinants of Health     Financial Resource Strain:     Difficulty of Paying Living Expenses: Not on file   Food Insecurity:     Worried About Running Out of Food in the Last Year: Not on file    Jorge of Food in the Last Year: Not on file   Transportation Needs:     Lack of Transportation (Medical):  Not on file    Lack of Transportation (Non-Medical): Not on file   Physical Activity:     Days of Exercise per Week: Not on file    Minutes of Exercise per Session: Not on file   Stress:     Feeling of Stress : Not on file   Social Connections:     Frequency of Communication with Friends and Family: Not on file    Frequency of Social Gatherings with Friends and Family: Not on file    Attends Restoration Services: Not on file    Active Member of [address] [place] [address] or Organizations: Not on file    Attends Club or Organization Meetings: Not on file    Marital Status: Not on file   Intimate Partner Violence:     Fear of Current or Ex-Partner: Not on file    Emotionally Abused: Not on file    Physically Abused: Not on file    Sexually Abused: Not on file   Housing Stability:     Unable to Pay for Housing in the Last Year: Not on file    Number of [place] in the Last Year: Not on file    Unstable Housing in the Last Year: Not on file     Past Surgical History:   Procedure Laterality Date    APPENDECTOMY      CARDIAC CATHETERIZATION      no stents    CHOLECYSTECTOMY      COLONOSCOPY      COLONOSCOPY  07/10/2017    polypsx3, divertics, hems    ENDOSCOPY, COLON, DIAGNOSTIC      ENDOSCOPY, COLON, DIAGNOSTIC  07/10/2017    Hiatal hernia    KNEE SURGERY Left      Past Medical History:   Diagnosis Date    Esophageal varices ([address]     H/O Doppler ultrasound 12/12/2017    The right and left internal carotid arteries demonstrates 0-50% stenosis Bilateral vertebral arteries are patent with flow in the normal direction.  H/O echocardiogram 12/11/2017    EF 63%. Mild concentric LVH. Impaired relaxation compatible with diastolic dysfunction. Trivial aortic regurgitation.  H/O exercise stress test 12/12/2017    ECG portion of stress test is negative for ischemia. Frequent PVC's and occasional couplets.     Hyperlipidemia     Hypertension     Hypertriglyceridemia 01/02/2018    On lopid therapy     Allergies   Allergen Reactions    Lipitor [Atorvastatin]      Severe muscle aches on low-dose of 10 mg daily. Prior to Admission medications    Medication Sig Start Date End Date Taking? Authorizing Provider   loratadine (CLARITIN) 10 MG tablet Take 1 tablet by mouth daily 12/3/21  Yes Louis Curry DO   amoxicillin (AMOXIL) 500 MG capsule Take 1 capsule by mouth 2 times daily for 10 days 12/3/21 12/13/21 Yes Louis Curry DO   simvastatin (ZOCOR) 20 MG tablet Take 1 tablet by mouth nightly 11/21/18   Stephania Mcclain MD   Omega-3 Fatty Acids (FISH OIL) 1000 MG CAPS Take 2,000 mg by mouth 2 times daily    Historical Provider, MD   acetaminophen (TYLENOL) 500 MG tablet Take 500 mg by mouth as needed for Pain    Historical Provider, MD   Coenzyme Q10 (COQ-10) 50 MG CAPS Take 50 mg by mouth daily    Historical Provider, MD   aspirin 81 MG chewable tablet Take 1 tablet by mouth daily 12/13/17   Nevin Gitelman, MD   vitamin E 400 UNIT capsule Take 400 Units by mouth daily    Historical Provider, MD   esomeprazole Magnesium (NEXIUM) 20 MG PACK Take 40 mg by mouth 2 times daily     Historical Provider, MD   lisinopril (PRINIVIL;ZESTRIL) 20 MG tablet Take 20 mg by mouth daily    Historical Provider, MD   citalopram (CELEXA) 20 MG tablet Take 20 mg by mouth nightly     Historical Provider, MD       BP (!) 190/90   Pulse 86   Temp 97.7 °F (36.5 °C) (Oral)   Resp 18   Ht 5' 10\" (1.778 m)   Wt 210 lb (95.3 kg)   SpO2 93%   BMI 30.13 kg/m²     Physical Exam  Vitals and nursing note reviewed. Constitutional:       Appearance: He is well-developed. HENT:      Head: Normocephalic and atraumatic. Right Ear: External ear normal. Tympanic membrane is erythematous and bulging. Tympanic membrane has decreased mobility. Left Ear: External ear normal. Tympanic membrane is erythematous and bulging. Tympanic membrane has decreased mobility. Nose: Congestion and rhinorrhea present.       Mouth/Throat:      Pharynx: Posterior oropharyngeal erythema present. No uvula swelling. Eyes:      Conjunctiva/sclera: Conjunctivae normal.      Pupils: Pupils are equal, round, and reactive to light. Cardiovascular:      Rate and Rhythm: Normal rate and regular rhythm. Heart sounds: Normal heart sounds. Pulmonary:      Effort: Pulmonary effort is normal.      Breath sounds: Normal breath sounds. Abdominal:      General: Bowel sounds are normal.      Palpations: Abdomen is soft. Musculoskeletal:         General: Normal range of motion. Cervical back: Normal range of motion and neck supple. Skin:     General: Skin is warm and dry. Neurological:      Mental Status: He is alert and oriented to person, place, and time. GCS: GCS eye subscore is 4. GCS verbal subscore is 5. GCS motor subscore is 6. Psychiatric:         Behavior: Behavior normal.         Thought Content: Thought content normal.         Judgment: Judgment normal.         MDM:    Labs Reviewed - No data to display    No orders to display        Supportive care. ITTLTETS   My typical dicussion, presentation, and considerations for this patients' chief complaint, diagnosis, differential diagnosis, medications, medication use,  medication safety and medication interactions have been explained and outlined to this patient for this patient encounter. I have stressed need for follow up and reexamination for this encounter     Final Impression    1.  Acute serous otitis media, recurrence not specified, unspecified laterality              [address], DO  12/03/21 1880

## 2022-05-12 ENCOUNTER — APPOINTMENT (OUTPATIENT)
Dept: CT IMAGING | Age: 82
End: 2022-05-12
Payer: MEDICARE

## 2022-05-12 ENCOUNTER — APPOINTMENT (OUTPATIENT)
Dept: GENERAL RADIOLOGY | Age: 82
End: 2022-05-12
Payer: MEDICARE

## 2022-05-12 ENCOUNTER — HOSPITAL ENCOUNTER (EMERGENCY)
Age: 82
Discharge: ANOTHER HOSPITAL | End: 2022-05-13
Attending: EMERGENCY MEDICINE
Payer: MEDICARE

## 2022-05-12 DIAGNOSIS — N17.9 ACUTE KIDNEY INJURY (HCC): ICD-10-CM

## 2022-05-12 DIAGNOSIS — R55 SYNCOPE AND COLLAPSE: Primary | ICD-10-CM

## 2022-05-12 LAB
ALBUMIN SERPL-MCNC: 4.7 GM/DL (ref 3.4–5)
ALP BLD-CCNC: 41 IU/L (ref 40–129)
ALT SERPL-CCNC: 29 U/L (ref 10–40)
ANION GAP SERPL CALCULATED.3IONS-SCNC: 22 MMOL/L (ref 4–16)
AST SERPL-CCNC: 22 IU/L (ref 15–37)
BASOPHILS ABSOLUTE: 0 K/CU MM
BASOPHILS RELATIVE PERCENT: 0.9 % (ref 0–1)
BILIRUB SERPL-MCNC: 0.2 MG/DL (ref 0–1)
BUN BLDV-MCNC: 52 MG/DL (ref 6–23)
CALCIUM SERPL-MCNC: 9.4 MG/DL (ref 8.3–10.6)
CHLORIDE BLD-SCNC: 100 MMOL/L (ref 99–110)
CO2: 17 MMOL/L (ref 21–32)
CREAT SERPL-MCNC: 2.6 MG/DL (ref 0.9–1.3)
D DIMER: <200 NG/ML(DDU)
DIFFERENTIAL TYPE: ABNORMAL
EOSINOPHILS ABSOLUTE: 0.1 K/CU MM
EOSINOPHILS RELATIVE PERCENT: 1.7 % (ref 0–3)
GFR AFRICAN AMERICAN: 29 ML/MIN/1.73M2
GFR NON-AFRICAN AMERICAN: 24 ML/MIN/1.73M2
GLUCOSE BLD-MCNC: 116 MG/DL (ref 70–99)
HCT VFR BLD CALC: 34.4 % (ref 42–52)
HEMOGLOBIN: 11.5 GM/DL (ref 13.5–18)
IMMATURE NEUTROPHIL %: 0.3 % (ref 0–0.43)
LIPASE: 80 IU/L (ref 13–60)
LYMPHOCYTES ABSOLUTE: 1.2 K/CU MM
LYMPHOCYTES RELATIVE PERCENT: 34.6 % (ref 24–44)
MAGNESIUM: 2 MG/DL (ref 1.8–2.4)
MCH RBC QN AUTO: 30.7 PG (ref 27–31)
MCHC RBC AUTO-ENTMCNC: 33.4 % (ref 32–36)
MCV RBC AUTO: 91.7 FL (ref 78–100)
MONOCYTES ABSOLUTE: 0.5 K/CU MM
MONOCYTES RELATIVE PERCENT: 13.8 % (ref 0–4)
PDW BLD-RTO: 13 % (ref 11.7–14.9)
PLATELET # BLD: 257 K/CU MM (ref 140–440)
PMV BLD AUTO: 9.1 FL (ref 7.5–11.1)
POTASSIUM SERPL-SCNC: 3.5 MMOL/L (ref 3.5–5.1)
PRO-BNP: 132.6 PG/ML
RBC # BLD: 3.75 M/CU MM (ref 4.6–6.2)
SEGMENTED NEUTROPHILS ABSOLUTE COUNT: 1.7 K/CU MM
SEGMENTED NEUTROPHILS RELATIVE PERCENT: 48.7 % (ref 36–66)
SODIUM BLD-SCNC: 139 MMOL/L (ref 135–145)
TOTAL IMMATURE NEUTOROPHIL: 0.01 K/CU MM
TOTAL PROTEIN: 6.9 GM/DL (ref 6.4–8.2)
TROPONIN T: <0.01 NG/ML
WBC # BLD: 3.5 K/CU MM (ref 4–10.5)

## 2022-05-12 PROCEDURE — 70486 CT MAXILLOFACIAL W/O DYE: CPT

## 2022-05-12 PROCEDURE — 74176 CT ABD & PELVIS W/O CONTRAST: CPT

## 2022-05-12 PROCEDURE — 72125 CT NECK SPINE W/O DYE: CPT

## 2022-05-12 PROCEDURE — 83880 ASSAY OF NATRIURETIC PEPTIDE: CPT

## 2022-05-12 PROCEDURE — 85025 COMPLETE CBC W/AUTO DIFF WBC: CPT

## 2022-05-12 PROCEDURE — 71045 X-RAY EXAM CHEST 1 VIEW: CPT

## 2022-05-12 PROCEDURE — 81001 URINALYSIS AUTO W/SCOPE: CPT

## 2022-05-12 PROCEDURE — 93005 ELECTROCARDIOGRAM TRACING: CPT | Performed by: EMERGENCY MEDICINE

## 2022-05-12 PROCEDURE — 99285 EMERGENCY DEPT VISIT HI MDM: CPT

## 2022-05-12 PROCEDURE — 70450 CT HEAD/BRAIN W/O DYE: CPT

## 2022-05-12 PROCEDURE — 6360000002 HC RX W HCPCS: Performed by: EMERGENCY MEDICINE

## 2022-05-12 PROCEDURE — 83690 ASSAY OF LIPASE: CPT

## 2022-05-12 PROCEDURE — 84484 ASSAY OF TROPONIN QUANT: CPT

## 2022-05-12 PROCEDURE — 80053 COMPREHEN METABOLIC PANEL: CPT

## 2022-05-12 PROCEDURE — 2580000003 HC RX 258: Performed by: EMERGENCY MEDICINE

## 2022-05-12 PROCEDURE — 96374 THER/PROPH/DIAG INJ IV PUSH: CPT

## 2022-05-12 PROCEDURE — 83735 ASSAY OF MAGNESIUM: CPT

## 2022-05-12 PROCEDURE — 85379 FIBRIN DEGRADATION QUANT: CPT

## 2022-05-12 RX ORDER — ONDANSETRON 2 MG/ML
4 INJECTION INTRAMUSCULAR; INTRAVENOUS ONCE
Status: COMPLETED | OUTPATIENT
Start: 2022-05-12 | End: 2022-05-12

## 2022-05-12 RX ORDER — SODIUM CHLORIDE 9 MG/ML
1000 INJECTION, SOLUTION INTRAVENOUS CONTINUOUS
Status: DISCONTINUED | OUTPATIENT
Start: 2022-05-12 | End: 2022-05-13 | Stop reason: HOSPADM

## 2022-05-12 RX ADMIN — SODIUM CHLORIDE 1000 ML: 9 INJECTION, SOLUTION INTRAVENOUS at 23:39

## 2022-05-12 RX ADMIN — ONDANSETRON 4 MG: 2 INJECTION INTRAMUSCULAR; INTRAVENOUS at 23:10

## 2022-05-12 ASSESSMENT — ENCOUNTER SYMPTOMS
CONSTIPATION: 0
NAUSEA: 0
COUGH: 0
RHINORRHEA: 0
WHEEZING: 0
VOMITING: 0
DIARRHEA: 0
SORE THROAT: 0
SINUS PRESSURE: 0
SHORTNESS OF BREATH: 1
ABDOMINAL PAIN: 0

## 2022-05-13 ENCOUNTER — HOSPITAL ENCOUNTER (INPATIENT)
Age: 82
LOS: 4 days | Discharge: HOME | DRG: 286 | End: 2022-05-17
Attending: INTERNAL MEDICINE | Admitting: INTERNAL MEDICINE
Payer: MEDICARE

## 2022-05-13 VITALS
WEIGHT: 210 LBS | BODY MASS INDEX: 30.06 KG/M2 | HEART RATE: 72 BPM | DIASTOLIC BLOOD PRESSURE: 68 MMHG | OXYGEN SATURATION: 93 % | TEMPERATURE: 98 F | RESPIRATION RATE: 12 BRPM | SYSTOLIC BLOOD PRESSURE: 130 MMHG | HEIGHT: 70 IN

## 2022-05-13 PROBLEM — N17.9 AKI (ACUTE KIDNEY INJURY) (HCC): Status: ACTIVE | Noted: 2022-05-13

## 2022-05-13 LAB
ANION GAP SERPL CALCULATED.3IONS-SCNC: 15 MMOL/L (ref 4–16)
BACTERIA: ABNORMAL /HPF
BASOPHILS ABSOLUTE: 0 K/CU MM
BASOPHILS RELATIVE PERCENT: 0.8 % (ref 0–1)
BILIRUBIN URINE: NEGATIVE MG/DL
BLOOD, URINE: NEGATIVE
BUN BLDV-MCNC: 44 MG/DL (ref 6–23)
CALCIUM SERPL-MCNC: 9.2 MG/DL (ref 8.3–10.6)
CAST TYPE: NEGATIVE /HPF
CHLORIDE BLD-SCNC: 102 MMOL/L (ref 99–110)
CHOLESTEROL, FASTING: 270 MG/DL
CLARITY: ABNORMAL
CO2: 22 MMOL/L (ref 21–32)
COLOR: YELLOW
CREAT SERPL-MCNC: 1.8 MG/DL (ref 0.9–1.3)
CRYSTAL TYPE: NEGATIVE /HPF
DIFFERENTIAL TYPE: ABNORMAL
EKG ATRIAL RATE: 63 BPM
EKG ATRIAL RATE: 75 BPM
EKG DIAGNOSIS: NORMAL
EKG DIAGNOSIS: NORMAL
EKG P AXIS: 59 DEGREES
EKG P AXIS: 97 DEGREES
EKG P-R INTERVAL: 162 MS
EKG P-R INTERVAL: 164 MS
EKG Q-T INTERVAL: 416 MS
EKG Q-T INTERVAL: 438 MS
EKG QRS DURATION: 124 MS
EKG QRS DURATION: 126 MS
EKG QTC CALCULATION (BAZETT): 448 MS
EKG QTC CALCULATION (BAZETT): 464 MS
EKG R AXIS: -52 DEGREES
EKG R AXIS: -64 DEGREES
EKG T AXIS: 21 DEGREES
EKG T AXIS: 69 DEGREES
EKG VENTRICULAR RATE: 63 BPM
EKG VENTRICULAR RATE: 75 BPM
EOSINOPHILS ABSOLUTE: 0.1 K/CU MM
EOSINOPHILS RELATIVE PERCENT: 1.7 % (ref 0–3)
EPITHELIAL CELLS, UA: ABNORMAL /HPF
GFR AFRICAN AMERICAN: 44 ML/MIN/1.73M2
GFR NON-AFRICAN AMERICAN: 37 ML/MIN/1.73M2
GLUCOSE BLD-MCNC: 106 MG/DL (ref 70–99)
GLUCOSE, URINE: 100 MG/DL
HCT VFR BLD CALC: 33.6 % (ref 42–52)
HDLC SERPL-MCNC: 22 MG/DL
HEMOGLOBIN: 11.3 GM/DL (ref 13.5–18)
IMMATURE NEUTROPHIL %: 1.1 % (ref 0–0.43)
KETONES, URINE: NEGATIVE MG/DL
LDL CHOLESTEROL CALCULATED: ABNORMAL MG/DL
LDL CHOLESTEROL DIRECT: 157 MG/DL
LEUKOCYTE ESTERASE, URINE: NEGATIVE
LV EF: 50 %
LVEF MODALITY: NORMAL
LYMPHOCYTES ABSOLUTE: 1.3 K/CU MM
LYMPHOCYTES RELATIVE PERCENT: 35.6 % (ref 24–44)
MCH RBC QN AUTO: 30.9 PG (ref 27–31)
MCHC RBC AUTO-ENTMCNC: 33.6 % (ref 32–36)
MCV RBC AUTO: 91.8 FL (ref 78–100)
MONOCYTES ABSOLUTE: 0.5 K/CU MM
MONOCYTES RELATIVE PERCENT: 14.8 % (ref 0–4)
NITRITE URINE, QUANTITATIVE: NEGATIVE
NUCLEATED RBC %: 0 %
PDW BLD-RTO: 13.2 % (ref 11.7–14.9)
PH, URINE: 5.5 (ref 5–8)
PLATELET # BLD: 232 K/CU MM (ref 140–440)
PMV BLD AUTO: 9 FL (ref 7.5–11.1)
POTASSIUM SERPL-SCNC: 4.4 MMOL/L (ref 3.5–5.1)
PROTEIN UA: NEGATIVE MG/DL
RBC # BLD: 3.66 M/CU MM (ref 4.6–6.2)
RBC URINE: NEGATIVE /HPF (ref 0–3)
SEGMENTED NEUTROPHILS ABSOLUTE COUNT: 1.6 K/CU MM
SEGMENTED NEUTROPHILS RELATIVE PERCENT: 46 % (ref 36–66)
SODIUM BLD-SCNC: 139 MMOL/L (ref 135–145)
SPECIFIC GRAVITY UA: 1.02 (ref 1–1.03)
TOTAL IMMATURE NEUTOROPHIL: 0.04 K/CU MM
TOTAL NUCLEATED RBC: 0 K/CU MM
TRIGLYCERIDE, FASTING: 419 MG/DL
TROPONIN T: <0.01 NG/ML
TSH HIGH SENSITIVITY: 0.18 UIU/ML (ref 0.27–4.2)
UROBILINOGEN, URINE: 0.2 MG/DL (ref 0.2–1)
WBC # BLD: 3.6 K/CU MM (ref 4–10.5)
WBC UA: NEGATIVE /HPF (ref 0–2)

## 2022-05-13 PROCEDURE — 93010 ELECTROCARDIOGRAM REPORT: CPT | Performed by: INTERNAL MEDICINE

## 2022-05-13 PROCEDURE — 80061 LIPID PANEL: CPT

## 2022-05-13 PROCEDURE — 83721 ASSAY OF BLOOD LIPOPROTEIN: CPT

## 2022-05-13 PROCEDURE — 6360000002 HC RX W HCPCS: Performed by: INTERNAL MEDICINE

## 2022-05-13 PROCEDURE — 99222 1ST HOSP IP/OBS MODERATE 55: CPT | Performed by: INTERNAL MEDICINE

## 2022-05-13 PROCEDURE — 2580000003 HC RX 258: Performed by: INTERNAL MEDICINE

## 2022-05-13 PROCEDURE — 1200000000 HC SEMI PRIVATE

## 2022-05-13 PROCEDURE — 94761 N-INVAS EAR/PLS OXIMETRY MLT: CPT

## 2022-05-13 PROCEDURE — 36415 COLL VENOUS BLD VENIPUNCTURE: CPT

## 2022-05-13 PROCEDURE — 80048 BASIC METABOLIC PNL TOTAL CA: CPT

## 2022-05-13 PROCEDURE — 85025 COMPLETE CBC W/AUTO DIFF WBC: CPT

## 2022-05-13 PROCEDURE — 6370000000 HC RX 637 (ALT 250 FOR IP): Performed by: INTERNAL MEDICINE

## 2022-05-13 PROCEDURE — 84443 ASSAY THYROID STIM HORMONE: CPT

## 2022-05-13 PROCEDURE — 93005 ELECTROCARDIOGRAM TRACING: CPT | Performed by: INTERNAL MEDICINE

## 2022-05-13 PROCEDURE — 84484 ASSAY OF TROPONIN QUANT: CPT

## 2022-05-13 PROCEDURE — 93306 TTE W/DOPPLER COMPLETE: CPT

## 2022-05-13 RX ORDER — LANOLIN ALCOHOL/MO/W.PET/CERES
100 CREAM (GRAM) TOPICAL DAILY
Status: DISCONTINUED | OUTPATIENT
Start: 2022-05-13 | End: 2022-05-17 | Stop reason: HOSPADM

## 2022-05-13 RX ORDER — OMEGA-3-ACID ETHYL ESTERS 1 G/1
2000 CAPSULE, LIQUID FILLED ORAL 2 TIMES DAILY
Status: DISCONTINUED | OUTPATIENT
Start: 2022-05-13 | End: 2022-05-17 | Stop reason: HOSPADM

## 2022-05-13 RX ORDER — HEPARIN SODIUM 5000 [USP'U]/ML
5000 INJECTION, SOLUTION INTRAVENOUS; SUBCUTANEOUS 3 TIMES DAILY
Status: DISCONTINUED | OUTPATIENT
Start: 2022-05-13 | End: 2022-05-17 | Stop reason: HOSPADM

## 2022-05-13 RX ORDER — GEMFIBROZIL 600 MG/1
600 TABLET, FILM COATED ORAL
Status: DISCONTINUED | OUTPATIENT
Start: 2022-05-13 | End: 2022-05-17 | Stop reason: HOSPADM

## 2022-05-13 RX ORDER — ACETAMINOPHEN 650 MG/1
650 SUPPOSITORY RECTAL EVERY 6 HOURS PRN
Status: DISCONTINUED | OUTPATIENT
Start: 2022-05-13 | End: 2022-05-17 | Stop reason: HOSPADM

## 2022-05-13 RX ORDER — PANTOPRAZOLE SODIUM 40 MG/1
40 TABLET, DELAYED RELEASE ORAL NIGHTLY
Status: DISCONTINUED | OUTPATIENT
Start: 2022-05-13 | End: 2022-05-17 | Stop reason: HOSPADM

## 2022-05-13 RX ORDER — ESOMEPRAZOLE MAGNESIUM 20 MG/1
40 FOR SUSPENSION ORAL NIGHTLY
Status: DISCONTINUED | OUTPATIENT
Start: 2022-05-13 | End: 2022-05-13 | Stop reason: CLARIF

## 2022-05-13 RX ORDER — SODIUM CHLORIDE 9 MG/ML
INJECTION, SOLUTION INTRAVENOUS CONTINUOUS
Status: DISCONTINUED | OUTPATIENT
Start: 2022-05-13 | End: 2022-05-15

## 2022-05-13 RX ORDER — SODIUM CHLORIDE 9 MG/ML
INJECTION, SOLUTION INTRAVENOUS PRN
Status: DISCONTINUED | OUTPATIENT
Start: 2022-05-13 | End: 2022-05-17

## 2022-05-13 RX ORDER — UBIDECARENONE 100 MG
100 CAPSULE ORAL NIGHTLY
Status: DISCONTINUED | OUTPATIENT
Start: 2022-05-13 | End: 2022-05-17 | Stop reason: HOSPADM

## 2022-05-13 RX ORDER — FENOFIBRATE 54 MG/1
54 TABLET ORAL DAILY
Status: DISCONTINUED | OUTPATIENT
Start: 2022-05-13 | End: 2022-05-17 | Stop reason: HOSPADM

## 2022-05-13 RX ORDER — SODIUM CHLORIDE 0.9 % (FLUSH) 0.9 %
5-40 SYRINGE (ML) INJECTION EVERY 12 HOURS SCHEDULED
Status: DISCONTINUED | OUTPATIENT
Start: 2022-05-13 | End: 2022-05-17

## 2022-05-13 RX ORDER — POLYETHYLENE GLYCOL 3350 17 G/17G
17 POWDER, FOR SOLUTION ORAL DAILY PRN
Status: DISCONTINUED | OUTPATIENT
Start: 2022-05-13 | End: 2022-05-17 | Stop reason: HOSPADM

## 2022-05-13 RX ORDER — ONDANSETRON 4 MG/1
4 TABLET, ORALLY DISINTEGRATING ORAL EVERY 8 HOURS PRN
Status: DISCONTINUED | OUTPATIENT
Start: 2022-05-13 | End: 2022-05-17 | Stop reason: HOSPADM

## 2022-05-13 RX ORDER — LISINOPRIL AND HYDROCHLOROTHIAZIDE 20; 12.5 MG/1; MG/1
1 TABLET ORAL DAILY
COMMUNITY

## 2022-05-13 RX ORDER — ASPIRIN 81 MG/1
81 TABLET, CHEWABLE ORAL DAILY
Status: DISCONTINUED | OUTPATIENT
Start: 2022-05-13 | End: 2022-05-17 | Stop reason: HOSPADM

## 2022-05-13 RX ORDER — GEMFIBROZIL 600 MG/1
600 TABLET, FILM COATED ORAL
COMMUNITY

## 2022-05-13 RX ORDER — FENOFIBRATE 54 MG/1
54 TABLET ORAL DAILY
COMMUNITY

## 2022-05-13 RX ORDER — CITALOPRAM 20 MG/1
20 TABLET ORAL NIGHTLY
Status: DISCONTINUED | OUTPATIENT
Start: 2022-05-13 | End: 2022-05-17 | Stop reason: HOSPADM

## 2022-05-13 RX ORDER — SODIUM CHLORIDE 0.9 % (FLUSH) 0.9 %
5-40 SYRINGE (ML) INJECTION PRN
Status: DISCONTINUED | OUTPATIENT
Start: 2022-05-13 | End: 2022-05-17

## 2022-05-13 RX ORDER — ACETAMINOPHEN 325 MG/1
650 TABLET ORAL EVERY 6 HOURS PRN
Status: DISCONTINUED | OUTPATIENT
Start: 2022-05-13 | End: 2022-05-17 | Stop reason: SDUPTHER

## 2022-05-13 RX ORDER — ONDANSETRON 2 MG/ML
4 INJECTION INTRAMUSCULAR; INTRAVENOUS EVERY 6 HOURS PRN
Status: DISCONTINUED | OUTPATIENT
Start: 2022-05-13 | End: 2022-05-17 | Stop reason: HOSPADM

## 2022-05-13 RX ADMIN — OMEGA-3-ACID ETHYL ESTERS 2000 MG: 1 CAPSULE, LIQUID FILLED ORAL at 21:02

## 2022-05-13 RX ADMIN — CITALOPRAM 20 MG: 20 TABLET, FILM COATED ORAL at 21:02

## 2022-05-13 RX ADMIN — SODIUM CHLORIDE: 9 INJECTION, SOLUTION INTRAVENOUS at 10:16

## 2022-05-13 RX ADMIN — HEPARIN SODIUM 5000 UNITS: 5000 INJECTION INTRAVENOUS; SUBCUTANEOUS at 21:03

## 2022-05-13 RX ADMIN — Medication 100 MG: at 21:04

## 2022-05-13 RX ADMIN — GEMFIBROZIL 600 MG: 600 TABLET ORAL at 16:15

## 2022-05-13 RX ADMIN — OMEGA-3-ACID ETHYL ESTERS 2000 MG: 1 CAPSULE, LIQUID FILLED ORAL at 10:32

## 2022-05-13 RX ADMIN — SODIUM CHLORIDE, PRESERVATIVE FREE 10 ML: 5 INJECTION INTRAVENOUS at 21:06

## 2022-05-13 RX ADMIN — HEPARIN SODIUM 5000 UNITS: 5000 INJECTION INTRAVENOUS; SUBCUTANEOUS at 16:15

## 2022-05-13 RX ADMIN — GEMFIBROZIL 600 MG: 600 TABLET ORAL at 06:18

## 2022-05-13 RX ADMIN — Medication 100 MG: at 10:32

## 2022-05-13 RX ADMIN — PANTOPRAZOLE SODIUM 40 MG: 40 TABLET, DELAYED RELEASE ORAL at 21:03

## 2022-05-13 RX ADMIN — HEPARIN SODIUM 5000 UNITS: 5000 INJECTION INTRAVENOUS; SUBCUTANEOUS at 10:32

## 2022-05-13 RX ADMIN — ASPIRIN 81 MG 81 MG: 81 TABLET ORAL at 10:32

## 2022-05-13 ASSESSMENT — PAIN SCALES - GENERAL
PAINLEVEL_OUTOF10: 0
PAINLEVEL_OUTOF10: 0

## 2022-05-13 NOTE — PROGRESS NOTES
Patient admitted this morning by my colleague for acute kidney injury and near syncope after patient mowed his lawn. Noted nephrology and cardiology recommendation. Continue IV fluids. Patient denies any chest pain or shortness of breath this morning.   Discussed management plan at bedside with patient, spouse and daughter at length

## 2022-05-13 NOTE — ED PROVIDER NOTES
Emergency Department Encounter    Patient: Nelly Fry  MRN: [id number]  : 1940  Date of Evaluation: 2022  ED Provider:  [id number] [facility]    Triage Chief Complaint:   Fall (pt was lying supine when ems arrived. ), Dizziness (off-balance ), and Shortness of Breath    HPI:  Nelly Fry is a 80 y.o. male with past medical history significant for hypertension, hyperlipidemia who presents with a fall. Denies any anticoagulation, or antiplatelet. Patient states he was at home today, when he fell to the ground. He states that he suddenly felt off balance, and fell striking the left side of his face on the ground. Denies LOC. He is unsure how he fell or why he lost his balance. This fall was unwitnessed. Patient denies any dizziness or lightheadedness prior to falling. Patient does note that on and off today, he has had multiple episodes of shortness of breath made worse with exertion, and made better with rest.  He does note that he was able to mow the lawn today, however after mowing the lawn he did feel fatigued and exhausted. Denies any chest pain. Denies F/C, CP, palpitations, N/V, abdominal pain, dysuria, diarrhea and constipatin. Patient does admit to drinking alcohol daily, he typically has 2-4 shots of bourbon daily, typically around 3-4 PM, he did have 2 drinks today. ROS:  Review of Systems   Constitutional: Negative for chills and fever. HENT: Negative for congestion, rhinorrhea, sinus pressure and sore throat. Eyes: Negative for visual disturbance. Respiratory: Positive for shortness of breath. Negative for cough and wheezing. Cardiovascular: Negative for chest pain and palpitations. Gastrointestinal: Negative for abdominal pain, constipation, diarrhea, nausea and vomiting. Genitourinary: Negative for dysuria, frequency and urgency. Musculoskeletal: Negative for arthralgias and myalgias. Skin: Negative for rash. Neurological: Positive for dizziness. Negative for syncope. Psychiatric/Behavioral: Negative for agitation, behavioral problems and confusion. Past Medical History:   Diagnosis Date    Esophageal varices ([address]     H/O Doppler ultrasound 12/12/2017    The right and left internal carotid arteries demonstrates 0-50% stenosis Bilateral vertebral arteries are patent with flow in the normal direction.  H/O echocardiogram 12/11/2017    EF 63%. Mild concentric LVH. Impaired relaxation compatible with diastolic dysfunction. Trivial aortic regurgitation.  H/O exercise stress test 12/12/2017    ECG portion of stress test is negative for ischemia. Frequent PVC's and occasional couplets.  Hyperlipidemia     Hypertension     Hypertriglyceridemia 01/02/2018    On lopid therapy     Past Surgical History:   Procedure Laterality Date    APPENDECTOMY      CARDIAC CATHETERIZATION      no stents    CHOLECYSTECTOMY      COLONOSCOPY      COLONOSCOPY  07/10/2017    polypsx3, divertics, hems    ENDOSCOPY, COLON, DIAGNOSTIC      ENDOSCOPY, COLON, DIAGNOSTIC  07/10/2017    Hiatal hernia    KNEE SURGERY Left      History reviewed. No pertinent family history. Social History     Socioeconomic History    Marital status:      Spouse name: Not on file    Number of children: Not on file    Years of education: Not on file    Highest education level: Not on file   Occupational History    Not on file   Tobacco Use    Smoking status: Former Smoker    Smokeless tobacco: Former User   Vaping Use    Vaping Use: Never used   Substance and Sexual Activity    Alcohol use:  Yes     Alcohol/week: 4.0 standard drinks     Types: 4 Shots of liquor per week     Comment: frequently    Drug use: No    Sexual activity: Yes     Partners: Female   Other Topics Concern    Not on file   Social History Narrative    Not on file     Social Determinants of Health     Financial Resource Strain:     Difficulty of Paying Living Expenses: Not on file   Food Insecurity:     Worried About Running Out of Food in the Last Year: Not on file    Jorge of Food in the Last Year: Not on file   Transportation Needs:     Lack of Transportation (Medical): Not on file    Lack of Transportation (Non-Medical): Not on file   Physical Activity:     Days of Exercise per Week: Not on file    Minutes of Exercise per Session: Not on file   Stress:     Feeling of Stress : Not on file   Social Connections:     Frequency of Communication with Friends and Family: Not on file    Frequency of Social Gatherings with Friends and Family: Not on file    Attends Anabaptist Services: Not on file    Active Member of [address] [place] [address] [employer] or Organizations: Not on file    Attends Club or Organization Meetings: Not on file    Marital Status: Not on file   Intimate Partner Violence:     Fear of Current or Ex-Partner: Not on file    Emotionally Abused: Not on file    Physically Abused: Not on file    Sexually Abused: Not on file   Housing Stability:     Unable to Pay for Housing in the Last Year: Not on file    Number of [place] in the Last Year: Not on file    Unstable Housing in the Last Year: Not on file     No current facility-administered medications for this encounter.      Current Outpatient Medications   Medication Sig Dispense Refill    loratadine (CLARITIN) 10 MG tablet Take 1 tablet by mouth daily 10 tablet 0    simvastatin (ZOCOR) 20 MG tablet Take 1 tablet by mouth nightly 90 tablet 3    Omega-3 Fatty Acids (FISH OIL) 1000 MG CAPS Take 2,000 mg by mouth 2 times daily      acetaminophen (TYLENOL) 500 MG tablet Take 500 mg by mouth as needed for Pain      Coenzyme Q10 (COQ-10) 50 MG CAPS Take 50 mg by mouth daily      aspirin 81 MG chewable tablet Take 1 tablet by mouth daily 30 tablet 0    vitamin E 400 UNIT capsule Take 400 Units by mouth daily      esomeprazole Magnesium (NEXIUM) 20 MG PACK Take 40 mg by mouth 2 times daily       lisinopril (PRINIVIL;ZESTRIL) 20 MG tablet Take 20 mg by mouth daily      citalopram (CELEXA) 20 MG tablet Take 20 mg by mouth nightly        Allergies   Allergen Reactions    Lipitor [Atorvastatin]      Severe muscle aches on low-dose of 10 mg daily. Nursing Notes Reviewed    Physical Exam:  Triage VS:    ED Triage Vitals [05/12/22 2012]   Enc Vitals Group      BP (!) 106/51      Pulse 78      Resp 18      Temp 98 °F (36.7 °C)      Temp Source Oral      SpO2 95 %      Weight 210 lb (95.3 kg)      Height 5' 10\" (1.778 m)      Head Circumference       Peak Flow       Pain Score       Pain Loc       Pain Edu? Excl. in [address]? Physical Exam  Vitals and nursing note reviewed. Constitutional:       General: He is not in acute distress. Appearance: He is well-developed. He is not ill-appearing or toxic-appearing. HENT:      Head:      Comments: Ecchymosis without underlying crepitus of the left lateral eyebrow. Nose: Nose normal.      Mouth/Throat:      Mouth: Mucous membranes are moist.   Eyes:      Extraocular Movements: Extraocular movements intact. Conjunctiva/sclera: Conjunctivae normal.      Pupils: Pupils are equal, round, and reactive to light. Cardiovascular:      Rate and Rhythm: Normal rate and regular rhythm. Pulses: Normal pulses. Pulmonary:      Effort: Pulmonary effort is normal. No respiratory distress. Breath sounds: Normal breath sounds. No wheezing or rhonchi. Abdominal:      General: Abdomen is flat. Bowel sounds are normal. There is no distension. Palpations: Abdomen is soft. Tenderness: There is no abdominal tenderness. There is no guarding or rebound. Musculoskeletal:         General: Normal range of motion. Cervical back: Normal range of motion. Skin:     General: Skin is warm. Capillary Refill: Capillary refill takes less than 2 seconds. Neurological:      Mental Status: He is alert and oriented to person, place, and time. Mental status is at baseline.       GCS: GCS eye subscore is 4. GCS verbal subscore is 5. GCS motor subscore is 6. Cranial Nerves: Cranial nerves are intact. Sensory: Sensation is intact. Motor: Motor function is intact. Coordination: Coordination is intact. Comments: Patient answers all questions and follows all commands appropriately. Speech is fluid. Finger-to-nose intact.    Psychiatric:         Mood and Affect: Mood normal.              I have reviewed and interpreted all of the currently available lab results from this visit (if applicable):  Results for orders placed or performed during the hospital encounter of 05/12/22   CBC with Auto Differential   Result Value Ref Range    WBC 3.5 (L) 4.0 - 10.5 K/CU MM    RBC 3.75 (L) 4.6 - 6.2 M/CU MM    Hemoglobin 11.5 (L) 13.5 - 18.0 GM/DL    Hematocrit 34.4 (L) 42 - 52 %    MCV 91.7 78 - 100 FL    MCH 30.7 27 - 31 PG    MCHC 33.4 32.0 - 36.0 %    RDW 13.0 11.7 - 14.9 %    Platelets 385 701 - 775 K/CU MM    MPV 9.1 7.5 - 11.1 FL    Differential Type AUTOMATED DIFFERENTIAL     Segs Relative 48.7 36 - 66 %    Lymphocytes % 34.6 24 - 44 %    Monocytes % 13.8 (H) 0 - 4 %    Eosinophils % 1.7 0 - 3 %    Basophils % 0.9 0 - 1 %    Segs Absolute 1.7 K/CU MM    Lymphocytes Absolute 1.2 K/CU MM    Monocytes Absolute 0.5 K/CU MM    Eosinophils Absolute 0.1 K/CU MM    Basophils Absolute 0.0 K/CU MM    Immature Neutrophil % 0.3 0 - 0.43 %    Total Immature Neutrophil 0.01 K/CU MM   Comprehensive Metabolic Panel w/ Reflex to MG   Result Value Ref Range    Sodium 139 135 - 145 MMOL/L    Potassium 3.5 3.5 - 5.1 MMOL/L    Chloride 100 99 - 110 mMol/L    CO2 17 (L) 21 - 32 MMOL/L    BUN 52 (H) 6 - 23 MG/DL    CREATININE 2.6 (H) 0.9 - 1.3 MG/DL    Glucose 116 (H) 70 - 99 MG/DL    Calcium 9.4 8.3 - 10.6 MG/DL    Albumin 4.7 3.4 - 5.0 GM/DL    Total Protein 6.9 6.4 - 8.2 GM/DL    Total Bilirubin 0.2 0.0 - 1.0 MG/DL    ALT 29 10 - 40 U/L    AST 22 15 - 37 IU/L    Alkaline Phosphatase 41 40 - 129 IU/L    GFR Non- 24 (L) >60 mL/min/1.73m2    GFR  29 (L) >60 mL/min/1.73m2    Anion Gap 22 (H) 4 - 16   Lipase   Result Value Ref Range    Lipase 80 (H) 13 - 60 IU/L   Troponin   Result Value Ref Range    Troponin T <0.010 <0.01 NG/ML   Brain Natriuretic Peptide   Result Value Ref Range    Pro-.6 <300 PG/ML   D-Dimer, Quantitative   Result Value Ref Range    D-Dimer, Quant <200 <230 NG/mL(DDU)   Urinalysis with Microscopic   Result Value Ref Range    Color, UA YELLOW YELLOW    Clarity, UA SL HAZY CLEAR    Glucose, Urine 100 (A) NEGATIVE MG/DL    Bilirubin Urine NEGATIVE NEGATIVE MG/DL    Ketones, Urine NEGATIVE NEGATIVE MG/DL    Specific Gravity, UA 1.025 1.001 - 1.035    Blood, Urine NEGATIVE NEGATIVE    pH, Urine 5.5 5.0 - 8.0    Protein, UA NEGATIVE NEGATIVE MG/DL    Urobilinogen, Urine 0.2 0.2 - 1.0 MG/DL    Nitrite Urine, Quantitative NEGATIVE NEGATIVE    Leukocyte Esterase, Urine NEGATIVE NEGATIVE    RBC, UA NEGATIVE 0 - 3 /HPF    WBC, UA NEGATIVE 0 - 2 /HPF    Epithelial Cells, UA 0 TO 2 /HPF    Cast Type NEGATIVE (A) NO CAST FORMS SEEN /HPF    Bacteria, UA FEW NEGATIVE /HPF    Crystal Type NEGATIVE NEGATIVE /HPF   Magnesium   Result Value Ref Range    Magnesium 2.0 1.8 - 2.4 mg/dl      Radiographs (if obtained):    [] Radiologist's Report Reviewed:  CT ABDOMEN PELVIS WO CONTRAST Additional Contrast? None   Final Result   Negative for acute inflammatory process or bowel obstruction. Negative for nephrolithiasis or obstructive uropathy      Moderate colonic diverticulosis         CT FACIAL BONES WO CONTRAST   Final Result   No acute facial bone trauma. CT CERVICAL SPINE WO CONTRAST   Final Result   No acute abnormality of the cervical spine. CT HEAD WO CONTRAST   Final Result   No acute intracranial abnormality. XR CHEST PORTABLE   Final Result   Clear chest without acute cardiopulmonary process.                EKG (if obtained): (All EKG's are interpreted by myself in the absence of a cardiologist)  Normal sinus rhythm, rate 75, , , QTc 464, no acute ST elevation, diffuse artifact seen on EKG, left ventricular hypertrophy seen. Similar to EKG done on 1/22/2019. Chart review shows recent radiographs:  No results found. MDM:  Patient seen and examined. Labs and imaging obtained, patient with leukopenia, WBC 3.5, hemoglobin 11.5, platelets 633. Patient with acute kidney injury, BUN and creatinine elevated, BUN 52, creatinine 2.6, patient's creatinine is usually less than 1. Patient's GFR today is 24. Does also have decrease in bicarb, and elevated anion gap. Glucose is 116. Sodium and potassium are within acceptable limits. Initial troponin not detectable, . Patient did urinate here in the emergency department, is without signs of infection, ketones negative, no blood. CT head no acute disease. CT cervical spine no acute disease. CT facial bones no acute facial bone trauma. CT abdomen pelvis negative for acute inflammatory process or bowel obstruction, nephrolithiasis or obstructive uropathy, moderate colonic diverticulosis. Patient is started on IV maintenance fluids for his acute kidney injury. Given patient's acute kidney injury, and possible syncopal episode tonight, will transfer and admit for further evaluation at [facility]. I did offer patient admission here at [facility], however he states that his wife's nephrologist is at [facility], and he would prefer to be transferred there in case he is to see nephrology. Case discussed with Dr. Nicki Glover, agrees with admission. Clinical Impression:  1. Syncope and collapse    2. Acute kidney injury [facility])      Disposition referral (if applicable):  No follow-up provider specified.   Disposition medications (if applicable):  New Prescriptions    No medications on file       Comment: Please note this report has been produced using speech recognition software and may contain errors related to that system including errors in grammar, punctuation, and spelling, as well as words and phrases that may be inappropriate. Efforts were made to edit the dictations.        [id number] [facility],   05/13/22 [id number]

## 2022-05-13 NOTE — CARE COORDINATION
Reviewed chart and spoke with pt/wife/daughter (with pt's permission) about discharge needs/plans. Pt lives with his wife (who uses a walker to get around), PTA he was totally independent with ADL's and transportation. Has great family support that can help pt/wife as needed with both ADL's and transportation. Pt has a PCP and insurance that covers medications. Medications are very expensive but they are managing. We discussed [facility] but does not feel needed at this time. Plan is home with family . CM available if any needs arise.

## 2022-05-13 NOTE — PLAN OF CARE
Problem: Discharge Planning  Goal: Discharge to home or other facility with appropriate resources  5/13/2022 1454 by Hema Dockery RN  Outcome: Progressing  Flowsheets  Taken 5/13/2022 0626 by Felisa Nava RN  Discharge to home or other facility with appropriate resources: Refer to discharge planning if patient needs post-hospital services based on physician order or complex needs related to functional status, cognitive ability or social support system  Taken 5/13/2022 0540 by Felisa Nava RN  Discharge to home or other facility with appropriate resources: Refer to discharge planning if patient needs post-hospital services based on physician order or complex needs related to functional status, cognitive ability or social support system  Taken 5/13/2022 0539 by Felisa Nava RN  Discharge to home or other facility with appropriate resources: Refer to discharge planning if patient needs post-hospital services based on physician order or complex needs related to functional status, cognitive ability or social support system  5/13/2022 0519 by Felisa Nava RN  Outcome: Progressing  5/13/2022 0519 by Felisa Nava RN  Outcome: Progressing  5/13/2022 0518 by Felisa Nava RN  Outcome: Progressing     Problem: Safety - Adult  Goal: Free from fall injury  5/13/2022 1454 by Hema Dockery RN  Outcome: Progressing  5/13/2022 0519 by Felisa Nava RN  Outcome: Progressing  5/13/2022 0519 by Felisa Nava RN  Flowsheets (Taken 5/13/2022 0519)  Free From Fall Injury:   Instruct family/caregiver on patient safety   Based on caregiver fall risk screen, instruct family/caregiver to ask for assistance with transferring infant if caregiver noted to have fall risk factors  5/13/2022 0518 by Felisa Nava RN  Outcome: Progressing     Problem: Skin/Tissue Integrity  Goal: Absence of new skin breakdown  Description: 1. Monitor for areas of redness and/or skin breakdown  2. Assess vascular access sites hourly  3.   Every 4-6 hours minimum:  Change oxygen saturation probe site  4. Every 4-6 hours:  If on nasal continuous positive airway pressure, respiratory therapy assess nares and determine need for appliance change or resting period.   5/13/2022 1454 by Catie Cavazos RN  Outcome: Progressing  5/13/2022 0519 by Joel Martinez RN  Outcome: Progressing

## 2022-05-13 NOTE — ED NOTES
Call placed to [facility] for transfer to [facility], RN  05/13/22 0157

## 2022-05-13 NOTE — PLAN OF CARE
Problem: Discharge Planning  Goal: Discharge to home or other facility with appropriate resources  5/13/2022 0519 by Lukas Sanford RN  Outcome: Progressing  5/13/2022 0519 by Lukas Sanford RN  Outcome: Progressing  5/13/2022 0518 by Lukas Sanford RN  Outcome: Progressing     Problem: Safety - Adult  Goal: Free from fall injury  5/13/2022 0519 by Lukas Sanford RN  Outcome: Progressing  5/13/2022 0519 by Lukas Sanford RN  Flowsheets (Taken 5/13/2022 0519)  Free From Fall Injury:   Instruct family/caregiver on patient safety   Based on caregiver fall risk screen, instruct family/caregiver to ask for assistance with transferring infant if caregiver noted to have fall risk factors  5/13/2022 0518 by Lukas Sanford RN  Outcome: Progressing     Problem: Skin/Tissue Integrity  Goal: Absence of new skin breakdown  Description: 1. Monitor for areas of redness and/or skin breakdown  2. Assess vascular access sites hourly  3. Every 4-6 hours minimum:  Change oxygen saturation probe site  4. Every 4-6 hours:  If on nasal continuous positive airway pressure, respiratory therapy assess nares and determine need for appliance change or resting period.   Outcome: Progressing

## 2022-05-13 NOTE — H&P
History and Physical      Name:  Andre Meek /Age/Sex: 1940  (80 y.o. male)   MRN & CSN:  [id number] & [id number] Encounter Date/Time: 2022 5:12 AM EDT   Location:  [address] [place] [address]-A PCP: Guillermo Borden [id number] Day: 1    Assessment and Plan:     #. ATUL  -Creatinine 2.6, was 1-2021  -Patient was started on IV fluids at [facility] ED  -Continue [email or web address] cc/hour  -Consult nephrology-patient requesting to see Dr. Fernanda Rosales. -Monitor with repeat BMP. #.  Suspected syncope. -Patient reports that he did not lose consciousness, even if he did could have been very brief when he fell down. -CT head-no acute process  -Troponin<0.010. Repeat troponin  -2D echo ordered  -Cardiology consult    #. Anion gap metabolic acidosis-could be secondary to ATUL. #.  Fall  -CT head, CT facial bones, CT C-spine, CT abdomen/pelvis-no acute process. #.  Hypertension  -Patient is on lisinopril-HCTZ-hold due to ATUL    #. Hypertriglyceridemia  -Patient is on gemfibrozil, fenofibrate (holding due to low creatinine clearance). #.  Exertional dyspnea  -Consult cardiology  -2D echo ordered  -Stress test-2017-negative    #. Anemia  -Hemoglobin 11.5, was 13.5-21    #. Chronic alcohol use  -Patient admits to drinking bourbon one fifth/week  -[facility] protocol ordered  -Thiamine  -Fall precautions, seizure precautions. Disposition:   Current Living situation: home    Diet ADULT DIET; Regular; Low Fat/Low Chol/High Fiber/2 gm Na   DVT Prophylaxis [] Lovenox, [x]  Heparin, [] SCDs, [] Ambulation,  [] Eliquis, [] Xarelto   Code Status Full Code   Surrogate Decision Maker/ POA      History from:   EMR, patient. History of Present Illness:     Chief Complaint: ATUL (acute kidney injury) ([facility] [address]  Andre Meek is a 80 y.o. male with hypertension, hypertriglyceridemia, chronic alcohol use who was at [facility] ED after having a fall at home.   Patient reports that he was trying to get up from a chair when he felt off balance and fell on his left side hitting his face on the ground. Patient does not think that he lost consciousness, even if. It could have been very brief. Patient's daughter was at home at the time and called EMS. Patient reports that he mowed his lawn and then mowed his neighbors lawn. After that he felt short of breath. He went out for dinner with family and while coming back he had an episode of bowel incontinence. Patient reports that he could not get to the bathroom on time. Patient also noticed that his oxygen saturation was 88% and was using his wife's oxygen today. Patient reports that he has been feeling short of breath with exertion for the last 6-8 months. Denied any chest pain. Denying being sick recently, denied any fever, chills, cough, denied any nausea, vomiting, diarrhea. At presentation patient was noted to have /51, HR 78, RR 18, temp 98, saturating 95% on room air. Lab work significant for CO2 17, BUN 52, creatinine 2.6, proBNP 132, troponin<0.010, WBC 3.5, hemoglobin 11.5.  UA-not suggestive of infection. Patient had CT head, CT C-spine, CT facial bones, CT abdomen/pelvis-no acute process. Patient was sent to [facility] for further evaluation and management. Review of Systems: Need 10 Elements   10 point review of systems conducted and pertinent positives and negatives as per HPI. Objective:   No intake or output data in the 24 hours ending 05/13/22 0512   Vitals:   Vitals:    05/13/22 0500   BP: 134/77   Pulse: 66   Resp: 16   Temp: 97.8 °F (36.6 °C)   TempSrc: Oral   SpO2: 95%       Medications Prior to Admission   Reviewed medications with patient    Prior to Admission medications    Medication Sig Start Date End Date Taking?  Authorizing Provider   loratadine (CLARITIN) 10 MG tablet Take 1 tablet by mouth daily 12/3/21   Carolann Curry DO   simvastatin (ZOCOR) 20 MG tablet Take 1 tablet by mouth nightly 11/21/18   Elder Coronel MD   Omega-3 Fatty Acids (FISH OIL) 1000 MG CAPS Take 2,000 mg by mouth 2 times daily    Historical Provider, MD   acetaminophen (TYLENOL) 500 MG tablet Take 500 mg by mouth as needed for Pain    Historical Provider, MD   Coenzyme Q10 (COQ-10) 50 MG CAPS Take 50 mg by mouth daily    Historical Provider, MD   aspirin 81 MG chewable tablet Take 1 tablet by mouth daily 12/13/17   Edgardo Jackson MD   vitamin E 400 UNIT capsule Take 400 Units by mouth daily    Historical Provider, MD   esomeprazole Magnesium (NEXIUM) 20 MG PACK Take 40 mg by mouth 2 times daily     Historical Provider, MD   lisinopril (PRINIVIL;ZESTRIL) 20 MG tablet Take 20 mg by mouth daily    Historical Provider, MD   citalopram (CELEXA) 20 MG tablet Take 20 mg by mouth nightly     Historical Provider, MD       Physical Exam: Need 8 Elements   Physical Exam     GEN  -Awake, alert, NAD.   EYES   -PERRL. Bruising noted at the end of left eyebrow  HENT  -MM are moist.   NECK  -Supple, no apparent thyromegaly or masses. RESP  -LS CTA equal bilat, no wheezes, rales or rhonchi. Symmetric chest movement. No respiratory distress noted. C/V  -S1/S2 auscultated. RRR without appreciable M/R/G. No peripheral edema. No reproducible chest wall tenderness. GI  -Abdomen is soft, non-distended, no significant tenderness. No masses or guarding. + BS in all quadrants. Rectal exam deferred.   -No CVA tenderness. Engle catheter is not present. MS  -B/L extremities - No gross joint deformities. No swelling, intact sensation symmetrical.   SKIN  -Normal coloration, warm, dry. NEURO  - Awake, alert, oriented x 3, no focal deficits. PSYC  - Appropriate affect. Past Medical History:   Reviewed patient's past medical, surgical, social, family history and allergies.       PMHx   Past Medical History:   Diagnosis Date    Esophageal varices ([address]     H/O Doppler ultrasound 12/12/2017    The right and left internal carotid arteries demonstrates 0-50% stenosis Bilateral vertebral arteries are patent with flow in the normal direction.  H/O echocardiogram 12/11/2017    EF 63%. Mild concentric LVH. Impaired relaxation compatible with diastolic dysfunction. Trivial aortic regurgitation.  H/O exercise stress test 12/12/2017    ECG portion of stress test is negative for ischemia. Frequent PVC's and occasional couplets.  Hyperlipidemia     Hypertension     Hypertriglyceridemia 01/02/2018    On lopid therapy     PSHX:  has a past surgical history that includes knee surgery (Left); Cholecystectomy; Appendectomy; Cardiac catheterization; Colonoscopy; Colonoscopy (07/10/2017); Endoscopy, colon, diagnostic; and Endoscopy, colon, diagnostic (07/10/2017). Allergies: Allergies   Allergen Reactions    Lipitor [Atorvastatin]      Severe muscle aches on low-dose of 10 mg daily. Fam HX: family history is not on file. Soc HX:   Social History     Socioeconomic History    Marital status:      Spouse name: Not on file    Number of children: Not on file    Years of education: Not on file    Highest education level: Not on file   Occupational History    Not on file   Tobacco Use    Smoking status: Former Smoker    Smokeless tobacco: Former User   Vaping Use    Vaping Use: Never used   Substance and Sexual Activity    Alcohol use: Yes     Alcohol/week: 4.0 standard drinks     Types: 4 Shots of liquor per week     Comment: frequently    Drug use: No    Sexual activity: Yes     Partners: Female   Other Topics Concern    Not on file   Social History Narrative    Not on file     Social Determinants of Health     Financial Resource Strain:     Difficulty of Paying Living Expenses: Not on file   Food Insecurity:     Worried About Running Out of Food in the Last Year: Not on file    Jorge of Food in the Last Year: Not on file   Transportation Needs:     Lack of Transportation (Medical): Not on file    Lack of Transportation (Non-Medical):  Not on file   Physical Activity:     Days of Exercise per Week: Not on file    Minutes of Exercise per Session: Not on file   Stress:     Feeling of Stress : Not on file   Social Connections:     Frequency of Communication with Friends and Family: Not on file    Frequency of Social Gatherings with Friends and Family: Not on file    Attends [employer] Services: Not on file    Active Member of Clubs or Organizations: Not on file    Attends Club or Organization Meetings: Not on file    Marital Status: Not on file   Intimate Partner Violence:     Fear of Current or Ex-Partner: Not on file    Emotionally Abused: Not on file    Physically Abused: Not on file    Sexually Abused: Not on file   Housing Stability:     Unable to Pay for Housing in the Last Year: Not on file    Number of Places Lived in the Last Year: Not on file    Unstable Housing in the Last Year: Not on file       Medications:   Medications:    sodium chloride flush  5-40 mL IntraVENous 2 times per day    heparin (porcine)  5,000 Units SubCUTAneous TID      Infusions:    sodium chloride      sodium chloride       PRN Meds: sodium chloride flush, 5-40 mL, PRN  sodium chloride, , PRN  ondansetron, 4 mg, Q8H PRN   Or  ondansetron, 4 mg, Q6H PRN  polyethylene glycol, 17 g, Daily PRN  acetaminophen, 650 mg, Q6H PRN   Or  acetaminophen, 650 mg, Q6H PRN        Labs      CBC:   Recent Labs     05/12/22 2115   WBC 3.5*   HGB 11.5*        BMP:    Recent Labs     05/12/22 2115      K 3.5      CO2 17*   BUN 52*   CREATININE 2.6*   GLUCOSE 116*     Hepatic:   Recent Labs     05/12/22 2115   AST 22   ALT 29   BILITOT 0.2   ALKPHOS 41     Lipids:   Lab Results   Component Value Date    CHOL 189 05/25/2019    HDL 28 06/02/2021    TRIG 216 05/25/2019     Hemoglobin A1C:   Lab Results   Component Value Date    LABA1C 5.7 01/30/2020     TSH: No results found for: TSH  Troponin:   Lab Results   Component Value Date    TROPONINT <0.010 05/12/2022    TROPONINT <0.010 07/01/2018    TROPONINT <0.010 12/11/2017     Lactic Acid: No results for input(s): LACTA in the last 72 hours. BNP:   Recent Labs     05/12/22 2115   PROBNP 132.6     UA:  Lab Results   Component Value Date    NITRU NEGATIVE 05/12/2022    COLORU YELLOW 05/12/2022    WBCUA NEGATIVE 05/12/2022    RBCUA NEGATIVE 05/12/2022    MUCUS 1+ 06/26/2013    BACTERIA FEW 05/12/2022    CLARITYU SL HAZY 05/12/2022    SPECGRAV 1.025 05/12/2022    LEUKOCYTESUR NEGATIVE 05/12/2022    UROBILINOGEN 0.2 05/12/2022    BILIRUBINUR NEGATIVE 05/12/2022    BLOODU NEGATIVE 05/12/2022    KETUA NEGATIVE 05/12/2022     Urine Cultures: No results found for: Dona Day  Blood Cultures: No results found for: BC  No results found for: BLOODCULT2  Organism: No results found for: ORG    Imaging/Diagnostics Last 24 Hours   CT ABDOMEN PELVIS WO CONTRAST Additional Contrast? None    Result Date: 5/13/2022  EXAMINATION: CT OF THE ABDOMEN AND PELVIS WITHOUT CONTRAST 5/12/2022 11:00 pm TECHNIQUE: CT of the abdomen and pelvis was performed without the administration of intravenous contrast. Multiplanar reformatted images are provided for review. Automated exposure control, iterative reconstruction, and/or weight based adjustment of the mA/kV was utilized to reduce the radiation dose to as low as reasonably achievable. COMPARISON: None. HISTORY: ORDERING SYSTEM PROVIDED HISTORY: ATUL TECHNOLOGIST PROVIDED HISTORY: Reason for exam:->ATUL Additional Contrast?->None Decision Support Exception - unselect if not a suspected or confirmed emergency medical condition->Emergency Medical Condition (MA) Reason for Exam: fall FINDINGS: Lower Chest: Lung bases are clear Organs: Liver, spleen, adrenal glands, kidneys, pancreas. No hydronephrosis. Gallbladder is absent. GI/Bowel: Retained stool without obstruction. Colonic diverticulosis. Pelvis: Bladder and prostate unremarkable. Peritoneum/Retroperitoneum: No free air or free fluid. Aortic vascular calcifications.   Aorta not aneurysmal Bones/Soft Tissues: Multilevel degenerate changes     Negative for acute inflammatory process or bowel obstruction. Negative for nephrolithiasis or obstructive uropathy Moderate colonic diverticulosis     CT HEAD WO CONTRAST    Result Date: 5/12/2022  EXAMINATION: CT OF THE HEAD WITHOUT CONTRAST  5/12/2022 10:58 pm TECHNIQUE: CT of the head was performed without the administration of intravenous contrast. Automated exposure control, iterative reconstruction, and/or weight based adjustment of the mA/kV was utilized to reduce the radiation dose to as low as reasonably achievable. COMPARISON: None. HISTORY: ORDERING SYSTEM PROVIDED HISTORY: fall TECHNOLOGIST PROVIDED HISTORY: Reason for exam:->fall Has a \"code stroke\" or \"stroke alert\" been called? ->No Decision Support Exception - unselect if not a suspected or confirmed emergency medical condition->Emergency Medical Condition (MA) Reason for Exam: fall FINDINGS: BRAIN/VENTRICLES: There is no acute intracranial hemorrhage, mass effect or midline shift. No abnormal extra-axial fluid collection. The gray-white differentiation is maintained without evidence of an acute infarct. There is no evidence of hydrocephalus. ORBITS: The visualized portion of the orbits demonstrate no acute abnormality. SINUSES: The visualized paranasal sinuses and mastoid air cells demonstrate no acute abnormality. SOFT TISSUES/SKULL:  No acute abnormality of the visualized skull or soft tissues. No acute intracranial abnormality. CT FACIAL BONES WO CONTRAST    Result Date: 5/13/2022  EXAMINATION: CT OF THE FACE WITHOUT CONTRAST  5/12/2022 10:58 pm TECHNIQUE: CT of the face was performed without the administration of intravenous contrast. Multiplanar reformatted images are provided for review. Automated exposure control, iterative reconstruction, and/or weight based adjustment of the mA/kV was utilized to reduce the radiation dose to as low as reasonably achievable. 5/12/2022 9:47 pm COMPARISON: 07/01/2018 HISTORY: ORDERING SYSTEM PROVIDED HISTORY: sob TECHNOLOGIST PROVIDED HISTORY: Reason for exam:->sob Reason for Exam: sob FINDINGS: Cardiac and mediastinal silhouettes appear within normal limits for size. Pulmonary vascularity is normal.  No focal infiltrate or pleural effusion. No pneumothorax. No acute osseous abnormality. Degenerative changes in the shoulders and spine. Clear chest without acute cardiopulmonary process. Personally reviewed Lab Studies, Imaging, and discussed case with ED physician.     Electronically signed by Karyn Hutton MD on 5/13/2022 at 5:12 AM

## 2022-05-13 NOTE — CONSULTS
Patient:   Patricia Miller    Date:  22  :  1940, 80 y.o. Nephrologist: Aldair Brown MD  Provider: KELI Cassidy CNP    Reason for Consult: Stage III acute kidney injury    Consult requested by : Dr Angela Vale MD    Chief Complaint:   Syncopal episode    HISTORY OF PRESENT ILLNESS:   Mr. Divina Chow is a 79-year-old male, apparently had a syncopal episode with probable brief loss of consciousness at home. According to Mr. Divina Chow, he mowed during the daytime for a long time without drinking much water, and went to dinner with his family, when he came back and sat down in the bed, he was trying to stand up and walk ,he fell hitting his jaw and knee. His wife thought. according to him, he had brief period of loss of consciousness. Squad was called and he was taken to the [facility] emergency department as he is from [facility]. According to the ER provider note he was hypotensive relatively given his history of hypertension. His blood pressure was 106 over 50s and he was on ACE inhibitor's and thiazide at home. He underwent several diagnostic tests which include imaging and biochemical.  Imaging study which include CT of the abdomen and head, chest x-ray and CT of the head, facial CT as well as CT of the cervical spine without iodinated contrast was unrevealing. Biochemical testing showed significantly increased BUN and creatinine 52 and 2.6 mg/dL respectively, which is much higher than his most recent creatinine 1.0. Additionally serum bicarbonate of 17. Other pertinent labs include relative leukopenia with white count of 3.5, mild anemia hemoglobin 11.5, slightly high lipase of 80, UA without any active sediment, had glycosuria. He was given normal saline bolus followed by continuous normal saline and subsequently transferred here for further evaluation. I was able to review his prior creatinine since , it runs between 0.9 to 1.1 mg/dL.   His most recent creatinine was 1.0. PMH :   1. Longstanding hypertension requiring ACE inhibitor's with thiazide  2. Dyslipidemia requiring statins in February siderophages. 3. Gastroesophageal reflux disorder        PSH :  1. He had appendectomy and gallbladder removal as well as knee surgery in the past          Habits :   1. He does not smoke or take illicit drugs. But has good amount of alcohol consumption. Apparently drinks 1/5 of alcohol weekly. He had couple of drinks yesterday. CT did show some calcification of pancreas    Soc Hx:  1. He was born in [facility] but has been residing in [facility] since [address]. He was  twice, first time he was  in 1964 but ended up in divorce, he has been  a second time since 1976 and has 3 children. 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 He is still very active at home    [address]   1.  His dad lived up to 89 but had coronary artery bypass surgery, no history of kidney disease in the family        REVIEW OF SYSTEMS:     All pertinent ROS neg except   Apparent syncopal episode  Significant dyspnea on exertion gradually worsening for the last 6 months, also occasional leg cramp    Current Facility-Administered Medications   Medication Dose Route Frequency Provider Last Rate Last Admin    sodium chloride flush 0.9 % injection 5-40 mL  5-40 mL IntraVENous 2 times per day Jeanne Sprague MD        sodium chloride flush 0.9 % injection 5-40 mL  5-40 mL IntraVENous PRN Jeanne MD Celestine        0.9 % sodium chloride infusion   IntraVENous PRN Jeanne Sprague MD        ondansetron (ZOFRAN-ODT) disintegrating tablet 4 mg  4 mg Oral Q8H PRN Jeanne MD Celestine        Or    ondansetron (ZOFRAN) injection 4 mg  4 mg IntraVENous Q6H PRN Jeanne MD Celestine        polyethylene glycol (GLYCOLAX) packet 17 g  17 g Oral Daily PRN Jeanne MD Celestine        acetaminophen (TYLENOL) tablet 650 mg  650 mg Oral Q6H PRN Rebekah Lu MD        Or   Martinez acetaminophen (TYLENOL) suppository 650 mg  650 mg Rectal Q6H PRN Rebekah Lu MD        0.9 % sodium chloride infusion   IntraVENous Continuous Rebekah Lu  mL/hr at 05/13/22 0610 Restarted at 05/13/22 0610    heparin (porcine) injection 5,000 Units  5,000 Units SubCUTAneous TID Rebekah Lu MD        aspirin chewable tablet 81 mg  81 mg Oral Daily Rebekah Lu MD        citalopram (CELEXA) tablet 20 mg  20 mg Oral Nightly Rebekah Lu MD       Martinez coenzyme Q10 capsule 100 mg  100 mg Oral Nightly Rebekah Lu MD        [Held by provider] fenofibrate (TRICOR) tablet 54 mg  54 mg Oral Daily Rebekah Lu MD        gemfibrozil (LOPID) tablet 600 mg  600 mg Oral BID AC Rebekah Lu MD   600 mg at 05/13/22 5933    omega-3 acid ethyl esters (LOVAZA) capsule 2,000 mg  2,000 mg Oral BID Rebekah Lu MD        thiamine tablet 100 mg  100 mg Oral Daily Rebekah Lu MD        pantoprazole (PROTONIX) tablet 40 mg  40 mg Oral Nightly Yaritza Barrientos MD           /77   Pulse 66   Temp 97.8 °F (36.6 °C) (Oral)   Resp 16   Ht 5' 10\" (1.778 m)   Wt 210 lb (95.3 kg)   SpO2 95%   BMI 30.13 kg/m²     PHYSICAL EXAM:  General appearance: Alert, awake and oriented  HEENT: At least 2+ conjunctival pallor-he is edentulous  Neck: Supple, upper chest plethora  Heart: Seems regular rate and rhythm  LUNGS: No gross crackles on auscultation  Abdomen: Soft, nontender  Extremities: No overt edema    LABS:  CBC:   Lab Results   Component Value Date    WBC 3.6 05/13/2022    WBC 3.5 05/12/2022    WBC 4.8 01/14/2021    HGB 11.3 05/13/2022    HGB 11.5 05/12/2022    HGB 13.5 01/14/2021     05/13/2022     05/12/2022     01/14/2021     Renal Panel:   Lab Results   Component Value Date     05/12/2022     01/14/2021     01/30/2020    K 3.5 05/12/2022    K 4.1 01/14/2021    K 4.3 01/30/2020     05/12/2022     01/14/2021     01/30/2020    CO2 17 05/12/2022    CO2 23 01/14/2021    CO2 26 01/30/2020    BUN 52 05/12/2022    BUN 22 01/14/2021    BUN 22 01/30/2020    CREATININE 2.6 05/12/2022    CREATININE 1.0 01/14/2021    CREATININE 1.0 01/30/2020    GFRAA 29 05/12/2022    GFRAA >60 01/14/2021    GFRAA >60 01/30/2020    LABGLOM 24 05/12/2022    LABGLOM >60 01/14/2021    LABGLOM >60 01/30/2020    LABALBU 4.7 05/12/2022    LABALBU 5.0 05/25/2019    LABALBU 4.8 11/07/2018         Calcium:    Lab Results   Component Value Date    CALCIUM 9.4 05/12/2022     Phosphorus:    Lab Results   Component Value Date    PHOS 3.5 09/22/2010       U/A:    Lab Results   Component Value Date    PROTEINU NEGATIVE 05/12/2022    NITRU NEGATIVE 05/12/2022    COLORU YELLOW 05/12/2022    WBCUA NEGATIVE 05/12/2022    RBCUA NEGATIVE 05/12/2022    MUCUS 1+ 06/26/2013    BACTERIA FEW 05/12/2022    CLARITYU SL HAZY 05/12/2022    SPECGRAV 1.025 05/12/2022    UROBILINOGEN 0.2 05/12/2022    BILIRUBINUR NEGATIVE 05/12/2022    BLOODU NEGATIVE 05/12/2022    KETUA NEGATIVE 05/12/2022           IMPRESSION:  1. Stage III acute kidney injury-nonoliguric per patient-100% bland urine, given his hypotension, and syncopal episode, likely from volume depletion and associated hypotension. ,  She does have slightly atrophic bilateral kidney-and calcification of the pancreas  2. Syncopal episode-although all could have been from hypotension and associated volume depletion, but he is increasing dyspnea exertion, and risk factor, cardiomyopathy and or coronary artery disease a possibility  3. Underlying alcohol consumption likely with chronic pancreatitis, also hypertension and dyslipidemia    PLAN:    1. I would get good cardiac work-up, at least echocardiogram.  And perhaps noninvasive study for coronary artery disease, advised to abstain from alcohol  2. P.o.  food and fluid, may stop normal saline after total of 3 L  3. I expect his kidney function to improve, if not we will have to broaden the differential diagnosis  4. Probably treat with thiamine, folate and multivitamin, also watch for alcohol withdrawal, just in case.   5. Follow clinically and biochemically

## 2022-05-13 NOTE — CONSULTS
Chart reviewed  Full note to follow                      Name:  Jose Huffman /Age/Sex: 1940  (80 y.o. male)   MRN & CSN:  [id number] & [id number] Admission Date/Time: 2022  5:03 AM   Location:  [facility] PCP: Henry Lopez Day: 1          Referring physician:  Juan Diego Lara MD         Reason for consultation: Syncope        Thanks for referral.    Information source: Patient    CC; fell      HPI:   Thank you for involving me in taking  care of oJse Huffman who  is a 80 y. o.year  Old male  Presents with history of hypertension, hyperlipidemia, alcohol abuse admitted with a fall patient was trying to get out of the chair lost balance and fell its unclear whether he passed out or not       patient has been having shortness of breath for the past few weeks and was hypoxic to a sat of 88% initially when he came              Past medical history:    has a past medical history of Esophageal varices ([address] H/O Doppler ultrasound, H/O echocardiogram, H/O exercise stress test, Hyperlipidemia, Hypertension, and Hypertriglyceridemia. Past surgical history:   has a past surgical history that includes knee surgery (Left); Cholecystectomy; Appendectomy; Cardiac catheterization; Colonoscopy; Colonoscopy (07/10/2017); Endoscopy, colon, diagnostic; and Endoscopy, colon, diagnostic (07/10/2017). Social History:   reports that he has quit smoking. He has quit using smokeless tobacco. He reports current alcohol use of about 4.0 standard drinks of alcohol per week. He reports that he does not use drugs. Family history:  family history is not on file. Allergies   Allergen Reactions    Lipitor [Atorvastatin] Other (See Comments)     Severe muscle aches on low-dose of 10 mg daily.        sodium chloride flush 0.9 % injection 5-40 mL, 2 times per day  sodium chloride flush 0.9 % injection 5-40 mL, PRN  0.9 % sodium chloride infusion, PRN  ondansetron (ZOFRAN-ODT) disintegrating tablet 4 mg, Q8H PRN   Or  ondansetron (ZOFRAN) injection 4 mg, Q6H PRN  polyethylene glycol (GLYCOLAX) packet 17 g, Daily PRN  acetaminophen (TYLENOL) tablet 650 mg, Q6H PRN   Or  acetaminophen (TYLENOL) suppository 650 mg, Q6H PRN  0.9 % sodium chloride infusion, Continuous  heparin (porcine) injection 5,000 Units, TID  aspirin chewable tablet 81 mg, Daily  citalopram (CELEXA) tablet 20 mg, Nightly  coenzyme Q10 capsule 100 mg, Nightly  [Held by provider] fenofibrate (TRICOR) tablet 54 mg, Daily  gemfibrozil (LOPID) tablet 600 mg, BID AC  omega-3 acid ethyl esters (LOVAZA) capsule 2,000 mg, BID  thiamine tablet 100 mg, Daily  pantoprazole (PROTONIX) tablet 40 mg, Nightly      Current Facility-Administered Medications   Medication Dose Route Frequency Provider Last Rate Last Admin    sodium chloride flush 0.9 % injection 5-40 mL  5-40 mL IntraVENous 2 times per day Melany Cadena MD        sodium chloride flush 0.9 % injection 5-40 mL  5-40 mL IntraVENous PRN Melany Cadena MD        0.9 % sodium chloride infusion   IntraVENous PRN Melany Cadena MD        ondansetron (ZOFRAN-ODT) disintegrating tablet 4 mg  4 mg Oral Q8H PRN Melany Cadena MD        Or    ondansetron (ZOFRAN) injection 4 mg  4 mg IntraVENous Q6H PRN Melany Cadena MD        polyethylene glycol (GLYCOLAX) packet 17 g  17 g Oral Daily PRN Melany Cadena MD        acetaminophen (TYLENOL) tablet 650 mg  650 mg Oral Q6H PRN Melany Cadena MD        Or    acetaminophen (TYLENOL) suppository 650 mg  650 mg Rectal Q6H PRN Melany Cadena MD        0.9 % sodium chloride infusion   IntraVENous Continuous Melany Cadena  mL/hr at 05/13/22 0610 Restarted at 05/13/22 0610    heparin (porcine) injection 5,000 Units  5,000 Units SubCUTAneous TID Melany Cadena MD        aspirin chewable tablet 81 mg  81 mg Oral Daily Melany Cadena MD        citalopram (CELEXA) tablet 20 mg  20 mg Oral Nightly Pato Escamilla MD       Jolanta Her coenzyme Q10 capsule 100 mg  100 mg Oral Nightly Pato Escamilla MD        [Held by provider] fenofibrate (TRICOR) tablet 54 mg  54 mg Oral Daily Pato Escamilla MD        gemfibrozil (LOPID) tablet 600 mg  600 mg Oral BID AC Pato Escamilla MD   600 mg at 05/13/22 0618    omega-3 acid ethyl esters (LOVAZA) capsule 2,000 mg  2,000 mg Oral BID Pato Escamilla MD        thiamine tablet 100 mg  100 mg Oral Daily Pato Escamilla MD        pantoprazole (PROTONIX) tablet 40 mg  40 mg Oral Nightly Pato Escamilla MD         Review of Systems:  All 14 systems reviewed, all negative except for fall    Physical Examination:    /77   Pulse 66   Temp 97.8 °F (36.6 °C) (Oral)   Resp 16   Ht 5' 10\" (1.778 m)   Wt 210 lb (95.3 kg)   SpO2 95%   BMI 30.13 kg/m²      Wt Readings from Last 3 Encounters:   05/13/22 210 lb (95.3 kg)   05/12/22 210 lb (95.3 kg)   12/03/21 210 lb (95.3 kg)     Body mass index is 30.13 kg/m². General Appearance: Fair  Head: normocephalic     Eyes: normal, noninjected conjunctiva    ENT: normal mucosa, noninjected throat, normal     NECK: No JVP  No thyromegaly        Cardiovascular: No thrills palpated   Auscultation: Normal S1 and S2, no murmur   carotid bruit no   Abdominal Aorta no bruit    Respiratory:    Breath sounds clear    Extremities: Trace edema clubbing ,   no cyanosis    SKIN: Warm and well perfused, no pallor or cyanosis    Vascular exam:  Pedal Pulses: Palp bilaterally        Abdomen:  No masses or tenderness. No organomegaly noted. Neurological:  Oriented to time, place, and person   No focal neurological deficit noted.   Psychiatric:normal mood, no anxiety    Lab Review   Recent Results (from the past 24 hour(s))   CBC with Auto Differential    Collection Time: 05/12/22  9:15 PM   Result Value Ref Range    WBC 3.5 (L) 4.0 - 10.5 K/CU MM    RBC 3.75 (L) 4.6 - 6.2 M/CU MM    Hemoglobin 11.5 (L) 13.5 - 18.0 GM/DL    Hematocrit 34.4 (L) 42 - 52 %    MCV 91.7 78 - 100 FL    MCH 30.7 27 - 31 PG    MCHC 33.4 32.0 - 36.0 %    RDW 13.0 11.7 - 14.9 %    Platelets 047 032 - 543 K/CU MM    MPV 9.1 7.5 - 11.1 FL    Differential Type AUTOMATED DIFFERENTIAL     Segs Relative 48.7 36 - 66 %    Lymphocytes % 34.6 24 - 44 %    Monocytes % 13.8 (H) 0 - 4 %    Eosinophils % 1.7 0 - 3 %    Basophils % 0.9 0 - 1 %    Segs Absolute 1.7 K/CU MM    Lymphocytes Absolute 1.2 K/CU MM    Monocytes Absolute 0.5 K/CU MM    Eosinophils Absolute 0.1 K/CU MM    Basophils Absolute 0.0 K/CU MM    Immature Neutrophil % 0.3 0 - 0.43 %    Total Immature Neutrophil 0.01 K/CU MM   Comprehensive Metabolic Panel w/ Reflex to MG    Collection Time: 05/12/22  9:15 PM   Result Value Ref Range    Sodium 139 135 - 145 MMOL/L    Potassium 3.5 3.5 - 5.1 MMOL/L    Chloride 100 99 - 110 mMol/L    CO2 17 (L) 21 - 32 MMOL/L    BUN 52 (H) 6 - 23 MG/DL    CREATININE 2.6 (H) 0.9 - 1.3 MG/DL    Glucose 116 (H) 70 - 99 MG/DL    Calcium 9.4 8.3 - 10.6 MG/DL    Albumin 4.7 3.4 - 5.0 GM/DL    Total Protein 6.9 6.4 - 8.2 GM/DL    Total Bilirubin 0.2 0.0 - 1.0 MG/DL    ALT 29 10 - 40 U/L    AST 22 15 - 37 IU/L    Alkaline Phosphatase 41 40 - 129 IU/L    GFR Non- 24 (L) >60 mL/min/1.73m2    GFR  29 (L) >60 mL/min/1.73m2    Anion Gap 22 (H) 4 - 16   Lipase    Collection Time: 05/12/22  9:15 PM   Result Value Ref Range    Lipase 80 (H) 13 - 60 IU/L   Troponin    Collection Time: 05/12/22  9:15 PM   Result Value Ref Range    Troponin T <0.010 <0.01 NG/ML   Brain Natriuretic Peptide    Collection Time: 05/12/22  9:15 PM   Result Value Ref Range    Pro-.6 <300 PG/ML   D-Dimer, Quantitative    Collection Time: 05/12/22  9:15 PM   Result Value Ref Range    D-Dimer, Quant <200 <230 NG/mL(DDU)   Urinalysis with Microscopic    Collection Time: 05/12/22  9:15 PM   Result Value Ref Range    Color, UA YELLOW YELLOW    Clarity, UA SL HAZY CLEAR    Glucose, Urine 100 (A) NEGATIVE MG/DL    Bilirubin Urine NEGATIVE NEGATIVE MG/DL    Ketones, Urine NEGATIVE NEGATIVE MG/DL    Specific Gravity, UA 1.025 1.001 - 1.035    Blood, Urine NEGATIVE NEGATIVE    pH, Urine 5.5 5.0 - 8.0    Protein, UA NEGATIVE NEGATIVE MG/DL    Urobilinogen, Urine 0.2 0.2 - 1.0 MG/DL    Nitrite Urine, Quantitative NEGATIVE NEGATIVE    Leukocyte Esterase, Urine NEGATIVE NEGATIVE    RBC, UA NEGATIVE 0 - 3 /HPF    WBC, UA NEGATIVE 0 - 2 /HPF    Epithelial Cells, UA 0 TO 2 /HPF    Cast Type NEGATIVE (A) NO CAST FORMS SEEN /HPF    Bacteria, UA FEW NEGATIVE /HPF    Crystal Type NEGATIVE NEGATIVE /HPF   Magnesium    Collection Time: 05/12/22  9:15 PM   Result Value Ref Range    Magnesium 2.0 1.8 - 2.4 mg/dl   CBC with Auto Differential    Collection Time: 05/13/22  6:22 AM   Result Value Ref Range    WBC 3.6 (L) 4.0 - 10.5 K/CU MM    RBC 3.66 (L) 4.6 - 6.2 M/CU MM    Hemoglobin 11.3 (L) 13.5 - 18.0 GM/DL    Hematocrit 33.6 (L) 42 - 52 %    MCV 91.8 78 - 100 FL    MCH 30.9 27 - 31 PG    MCHC 33.6 32.0 - 36.0 %    RDW 13.2 11.7 - 14.9 %    Platelets 105 992 - 563 K/CU MM    MPV 9.0 7.5 - 11.1 FL    Differential Type AUTOMATED DIFFERENTIAL     Segs Relative 46.0 36 - 66 %    Lymphocytes % 35.6 24 - 44 %    Monocytes % 14.8 (H) 0 - 4 %    Eosinophils % 1.7 0 - 3 %    Basophils % 0.8 0 - 1 %    Segs Absolute 1.6 K/CU MM    Lymphocytes Absolute 1.3 K/CU MM    Monocytes Absolute 0.5 K/CU MM    Eosinophils Absolute 0.1 K/CU MM    Basophils Absolute 0.0 K/CU MM    Nucleated RBC % 0.0 %    Total Nucleated RBC 0.0 K/CU MM    Total Immature Neutrophil 0.04 K/CU MM    Immature Neutrophil % 1.1 (H) 0 - 0.43 %           Assessment/Recommendations:   -Possible syncope dehydration volume depletion, or vasovagal  We will check orthostatics  -Hypertension on lisinopril/hydrochlorothiazide hold off on the diuretics  -Hyper lipidemia on gemfibrozil  -Shortness of breath we will check an echo, patient's BNP level is normal  -Chronic EtOH abuse  -High creatinine per renal  - stress cardiolite on Mac MD Jacoby, 5/13/2022 7:23 AM       Please note this report has been partially produced using speech recognition software and may contain errors related to that system including errors in grammar, punctuation, and spelling, as well as words and phrases that may be inappropriate. If there are any questions or concerns please feel free to contact the dictating provider for clarification.

## 2022-05-13 NOTE — PROGRESS NOTES
4 Eyes Skin Assessment     NAME:  Annamaria Gamboa OF BIRTH:  1940  MEDICAL RECORD NUMBER:  [id number]    The patient is being assess for  Admission    I agree that 2 RN's have performed a thorough Head to Toe Skin Assessment on the patient. ALL assessment sites listed below have been assessed. Areas assessed by both nurses:    Head, Face, Ears, Shoulders, Back, Chest, Arms, Elbows, Hands, Sacrum. Buttock, Coccyx, Ischium and Legs. Feet and Heels        Does the Patient have a Wound?  No noted wound(s)       Edwin Prevention initiated:  Yes   Wound Care Orders initiated:  No    Pressure Injury (Stage 3,4, Unstageable, DTI, NWPT, and Complex wounds) if present place consult order under [de-identified] No    New and Established Ostomies if present place consult order under : No      Nurse 1 eSignature: Electronically signed by Svitlana Us RN on 5/13/22 at 6:14 AM EDT    **SHARE this note so that the co-signing nurse is able to place an eSignature**    Nurse 2 eSignature: Electronically signed by Daniel Helton LPN on 5/70/21 at 2:91 AM EDT

## 2022-05-14 LAB
ALBUMIN SERPL-MCNC: 4.7 GM/DL (ref 3.4–5)
ALP BLD-CCNC: 41 IU/L (ref 40–128)
ALT SERPL-CCNC: 25 U/L (ref 10–40)
ANION GAP SERPL CALCULATED.3IONS-SCNC: 12 MMOL/L (ref 4–16)
AST SERPL-CCNC: 20 IU/L (ref 15–37)
BILIRUB SERPL-MCNC: 0.3 MG/DL (ref 0–1)
BUN BLDV-MCNC: 30 MG/DL (ref 6–23)
CALCIUM SERPL-MCNC: 9.7 MG/DL (ref 8.3–10.6)
CHLORIDE BLD-SCNC: 105 MMOL/L (ref 99–110)
CO2: 23 MMOL/L (ref 21–32)
CREAT SERPL-MCNC: 1.3 MG/DL (ref 0.9–1.3)
GFR AFRICAN AMERICAN: >60 ML/MIN/1.73M2
GFR NON-AFRICAN AMERICAN: 53 ML/MIN/1.73M2
GLUCOSE BLD-MCNC: 100 MG/DL (ref 70–99)
MAGNESIUM: 1.9 MG/DL (ref 1.8–2.4)
PHOSPHORUS: 3.4 MG/DL (ref 2.5–4.9)
POTASSIUM SERPL-SCNC: 4.8 MMOL/L (ref 3.5–5.1)
SODIUM BLD-SCNC: 140 MMOL/L (ref 135–145)
TOTAL CK: 115 IU/L (ref 38–174)
TOTAL PROTEIN: 6.7 GM/DL (ref 6.4–8.2)

## 2022-05-14 PROCEDURE — APPSS60 APP SPLIT SHARED TIME 46-60 MINUTES: Performed by: NURSE PRACTITIONER

## 2022-05-14 PROCEDURE — 2580000003 HC RX 258: Performed by: INTERNAL MEDICINE

## 2022-05-14 PROCEDURE — 82550 ASSAY OF CK (CPK): CPT

## 2022-05-14 PROCEDURE — 6370000000 HC RX 637 (ALT 250 FOR IP): Performed by: INTERNAL MEDICINE

## 2022-05-14 PROCEDURE — 83735 ASSAY OF MAGNESIUM: CPT

## 2022-05-14 PROCEDURE — 80053 COMPREHEN METABOLIC PANEL: CPT

## 2022-05-14 PROCEDURE — 6360000002 HC RX W HCPCS: Performed by: INTERNAL MEDICINE

## 2022-05-14 PROCEDURE — 1200000000 HC SEMI PRIVATE

## 2022-05-14 PROCEDURE — 94761 N-INVAS EAR/PLS OXIMETRY MLT: CPT

## 2022-05-14 PROCEDURE — 36415 COLL VENOUS BLD VENIPUNCTURE: CPT

## 2022-05-14 PROCEDURE — 84100 ASSAY OF PHOSPHORUS: CPT

## 2022-05-14 PROCEDURE — 99233 SBSQ HOSP IP/OBS HIGH 50: CPT | Performed by: INTERNAL MEDICINE

## 2022-05-14 RX ADMIN — HEPARIN SODIUM 5000 UNITS: 5000 INJECTION INTRAVENOUS; SUBCUTANEOUS at 08:33

## 2022-05-14 RX ADMIN — HEPARIN SODIUM 5000 UNITS: 5000 INJECTION INTRAVENOUS; SUBCUTANEOUS at 16:03

## 2022-05-14 RX ADMIN — OMEGA-3-ACID ETHYL ESTERS 2000 MG: 1 CAPSULE, LIQUID FILLED ORAL at 08:33

## 2022-05-14 RX ADMIN — Medication 100 MG: at 08:33

## 2022-05-14 RX ADMIN — Medication 100 MG: at 20:35

## 2022-05-14 RX ADMIN — OMEGA-3-ACID ETHYL ESTERS 2000 MG: 1 CAPSULE, LIQUID FILLED ORAL at 20:35

## 2022-05-14 RX ADMIN — GEMFIBROZIL 600 MG: 600 TABLET ORAL at 16:02

## 2022-05-14 RX ADMIN — HEPARIN SODIUM 5000 UNITS: 5000 INJECTION INTRAVENOUS; SUBCUTANEOUS at 20:35

## 2022-05-14 RX ADMIN — CITALOPRAM 20 MG: 20 TABLET, FILM COATED ORAL at 20:35

## 2022-05-14 RX ADMIN — SODIUM CHLORIDE: 9 INJECTION, SOLUTION INTRAVENOUS at 08:41

## 2022-05-14 RX ADMIN — SODIUM CHLORIDE: 9 INJECTION, SOLUTION INTRAVENOUS at 18:50

## 2022-05-14 RX ADMIN — SODIUM CHLORIDE, PRESERVATIVE FREE 10 ML: 5 INJECTION INTRAVENOUS at 08:34

## 2022-05-14 RX ADMIN — PANTOPRAZOLE SODIUM 40 MG: 40 TABLET, DELAYED RELEASE ORAL at 20:35

## 2022-05-14 RX ADMIN — ASPIRIN 81 MG 81 MG: 81 TABLET ORAL at 08:33

## 2022-05-14 RX ADMIN — GEMFIBROZIL 600 MG: 600 TABLET ORAL at 05:42

## 2022-05-14 ASSESSMENT — PAIN SCALES - GENERAL
PAINLEVEL_OUTOF10: 0
PAINLEVEL_OUTOF10: 0

## 2022-05-14 NOTE — PROGRESS NOTES
Today's plan: Lengthy discussion with patient and family, patient will stay in the hospital for stress test on Monday, his echo was normal, his syncope was most likely from acute renal failure and dehydration continue to hold lisinopril and hydrochlorothiazide continue IV fluids normal saline at 75 cc an hour      Admit Date:  5/13/2022    Subjective: Okay      Chief complaints on admission syncope      History of present Juan Louise is a 80 y. o.year old who  presents with had no chief complaint listed for this encounter. Past medical history:    has a past medical history of Esophageal varices ([address] H/O Doppler ultrasound, H/O echocardiogram, H/O exercise stress test, Hyperlipidemia, Hypertension, and Hypertriglyceridemia. Past surgical history:   has a past surgical history that includes knee surgery (Left); Cholecystectomy; Appendectomy; Cardiac catheterization; Colonoscopy; Colonoscopy (07/10/2017); Endoscopy, colon, diagnostic; and Endoscopy, colon, diagnostic (07/10/2017). Social History:   reports that he has quit smoking. He has quit using smokeless tobacco. He reports current alcohol use of about 4.0 standard drinks of alcohol per week. He reports that he does not use drugs. Family history:  family history is not on file. Allergies   Allergen Reactions    Lipitor [Atorvastatin] Other (See Comments)     Severe muscle aches on low-dose of 10 mg daily. Objective:   /78   Pulse 76   Temp 97.6 °F (36.4 °C) (Oral)   Resp 18   Ht 5' 10\" (1.778 m)   Wt 210 lb (95.3 kg)   SpO2 95%   BMI 30.13 kg/m²       Intake/Output Summary (Last 24 hours) at 5/14/2022 1533  Last data filed at 5/14/2022 1329  Gross per 24 hour   Intake 960 ml   Output --   Net 960 ml            Physical Exam:  Constitutional:  Well developed, Well nourished, No acute distress, Non-toxic appearance.    HENT:  Normocephalic, Atraumatic, Bilateral external ears normal, Oropharynx moist, No oral exudates, Nose normal. Neck- Normal range of motion, No tenderness, Supple, No stridor. Eyes:  PERRL, EOMI, Conjunctiva normal, No discharge. Respiratory:  Normal breath sounds, No respiratory distress, No wheezing, No chest tenderness. ,no use of accessory muscles, diaphragm movement is normal  Cardiovascular: (PMI) apex non displaced,no lifts no thrills, no s3,no s4, Normal heart rate, Normal rhythm, No murmurs, No rubs, No gallops. Carotid arteries pulse and amplitude are normal no bruit, no abdominal bruit noted ( normal abdominal aorta ausculation), femoral arteries pulse and amplitude are normal no bruit, pedal pulses are normal  GI:  Bowel sounds normal, Soft, No tenderness, No masses, No pulsatile masses. : External genitalia appear normal, No masses or lesions. No discharge. No CVA tenderness. Musculoskeletal:  Intact distal pulses, No edema, No tenderness, No cyanosis, No clubbing. Good range of motion in all major joints. No tenderness to palpation or major deformities noted. Back- No tenderness. Integument:  Warm, Dry, No erythema, No rash. Lymphatic:  No lymphadenopathy noted. Neurologic:  Alert & oriented x 3, Normal motor function, Normal sensory function, No focal deficits noted.    Psychiatric:  Affect normal, Judgment normal, Mood normal.     Medications:    sodium chloride flush  5-40 mL IntraVENous 2 times per day    heparin (porcine)  5,000 Units SubCUTAneous TID    aspirin  81 mg Oral Daily    citalopram  20 mg Oral Nightly    coenzyme Q10  100 mg Oral Nightly    [Held by provider] fenofibrate  54 mg Oral Daily    gemfibrozil  600 mg Oral BID AC    omega-3 acid ethyl esters  2,000 mg Oral BID    thiamine  100 mg Oral Daily    pantoprazole  40 mg Oral Nightly      sodium chloride      sodium chloride 100 mL/hr at 05/14/22 0841     sodium chloride flush, sodium chloride, ondansetron **OR** ondansetron, polyethylene glycol, acetaminophen **OR** acetaminophen    Lab Data:  CBC:   Recent Labs     05/12/22 2115 05/13/22 0622   WBC 3.5* 3.6*   HGB 11.5* 11.3*   HCT 34.4* 33.6*   MCV 91.7 91.8    232     BMP:   Recent Labs     05/12/22 2115 05/13/22 0622 05/14/22  0428    139 140   K 3.5 4.4 4.8    102 105   CO2 17* 22 23   PHOS  --   --  3.4   BUN 52* 44* 30*   CREATININE 2.6* 1.8* 1.3     LIVER PROFILE:   Recent Labs     05/12/22 2115 05/14/22  0428   AST 22 20   ALT 29 25   LIPASE 80*  --    BILITOT 0.2 0.3   ALKPHOS 41 41     PT/INR: No results for input(s): PROTIME, INR in the last 72 hours. APTT: No results for input(s): APTT in the last 72 hours. BNP:  No results for input(s): BNP in the last 72 hours. TROPONIN: @TROPONINI:3@      Assessment:  80 y. o.year old who is admitted for          Plan:  Giancarlo as mentioned above from dehydration and acute renal failure, his blood pressure is stable his creatinine is normal now we will continue to hold hydrochlorothiazide and lisinopril continue IV fluids  renal failure resolved on IV fluids continue with  Hypertension controlled off hydrochlorothiazide and lisinopril  History of alcohol abuse stable  All labs, medications and tests reviewed, continue all other medications of all above medical condition listed as is.       Joel Aguilar MD, MD 5/14/2022 3:33 PM

## 2022-05-14 NOTE — PROGRESS NOTES
Shahram Yip MD    Hospitalist Progress Note      Name:  Griffin Bansal /Age/Sex: 1940  (80 y.o. male)   MRN & CSN:  [id number] & [id number] Admission Date/Time: 2022  5:03 AM   Location:  [address] [place] [address]-A PCP: KELI Ortiz CNP       I saw and examined the patient on 2022 at 11:43 AM.    Hospital Day: 2  Barriers to discharge: IV fluids, consultation, stress test on 2022  Assessment and Plan:     80years old man history of hypertension, chronic  Alcohol abuse was transferred from [facility] for evaluation of acute renal failure, syncope/fall at home and cardiac work-up      1. ATUL due to ATN on CKD 3. POA. -Creatinine 2.6, was 1-2021. Lisinopril/diuretics on hold. On IV fluids. Creatinine improving    2. Syncope and fall at home. Witnessed likely vasovagal/volume depletion. Patient mowed his lawn all day in the hot summer day. CT head, CT facial bones, CT C-spine, CT abdomen/pelvis-no acute process. , Troponin<0.010. 2D echo ordered. Cardiology following. No asymptomatic    3. Hypertension. PTA lisinopril-HCTZ, held due to ATUL     4. Hypertriglyceridemia. Patient is on gemfibrozil, fenofibrate (holding due to low creatinine clearance). 5.  Exertional dyspnea. Consult cardiology, 2D echo nonacute. Scheduled for stress test on Monday      6. Anemia. Hemoglobin 11.5, was 13.5-21     7. Chronic alcohol use. Patient admits to drinking bourbon one fifth/week. CIWA protocol ordered. No symptoms/signs of withdrawal    DVT prophylaxis with heparin  CODE STATUS is full code       Subjective:     Patient seen and examined at bedside. Reports feeling well overall. No chest pain overnight of this morning. No episodes of dizziness while ambulating to the restroom    Objective:      Intake/Output Summary (Last 24 hours) at 2022 1143  Last data filed at 2022 1855  Gross per 24 hour   Intake 720 ml   Output --   Net 720 ml      Vitals:   Vitals:    22 0842   BP: 138/78   Pulse: 76   Resp: 18   Temp: 97.6 °F (36.4 °C)   SpO2: 95%       Ten point ROS reviewed negative, unless as noted above    Physical Exam:     GENERAL APPEARANCE: not in any acute distress,  appears comfortable. NECK: Supple, no JVD.   CARDIOVASCULAR: Regular rate and rhythm, no murmurs, rubs or gallops  LUNGS: clear to auscultation bilaterally   ABDOMEN: normoactive bowel sounds, soft non-tender and non distended  EXTREMITIES: No edema  MUSCULOSKELETAL S: normal movement and normal bulk in all ext  NEUROLOGIC: Alert and oriented x 3- grossly non focal exam, moving all extremities   SKIN: No Rashes       Electronically signed by Sherryle Champ, MD on 5/14/2022 at 11:43 AM       Medications:   Medications:    sodium chloride flush  5-40 mL IntraVENous 2 times per day    heparin (porcine)  5,000 Units SubCUTAneous TID    aspirin  81 mg Oral Daily    citalopram  20 mg Oral Nightly    coenzyme Q10  100 mg Oral Nightly    [Held by provider] fenofibrate  54 mg Oral Daily    gemfibrozil  600 mg Oral BID AC    omega-3 acid ethyl esters  2,000 mg Oral BID    thiamine  100 mg Oral Daily    pantoprazole  40 mg Oral Nightly      Infusions:    sodium chloride      sodium chloride 100 mL/hr at 05/14/22 0841     PRN Meds: sodium chloride flush, 5-40 mL, PRN  sodium chloride, , PRN  ondansetron, 4 mg, Q8H PRN   Or  ondansetron, 4 mg, Q6H PRN  polyethylene glycol, 17 g, Daily PRN  acetaminophen, 650 mg, Q6H PRN   Or  acetaminophen, 650 mg, Q6H PRN

## 2022-05-14 NOTE — PLAN OF CARE
Patient was seen in cardiology yesterday, changes were made to his medications because of dizziness, please call back to discuss.  Thank you.   Problem: Discharge Planning  Goal: Discharge to home or other facility with appropriate resources  5/13/2022 2322 by Wander Keys RN  Outcome: Progressing  5/13/2022 1454 by Fermín Pena RN  Outcome: Progressing  Flowsheets  Taken 5/13/2022 0626 by Wander Keys RN  Discharge to home or other facility with appropriate resources: Refer to discharge planning if patient needs post-hospital services based on physician order or complex needs related to functional status, cognitive ability or social support system  Taken 5/13/2022 0540 by Wander Keys RN  Discharge to home or other facility with appropriate resources: Refer to discharge planning if patient needs post-hospital services based on physician order or complex needs related to functional status, cognitive ability or social support system  Taken 5/13/2022 0539 by Wander Keys RN  Discharge to home or other facility with appropriate resources: Refer to discharge planning if patient needs post-hospital services based on physician order or complex needs related to functional status, cognitive ability or social support system     Problem: Safety - Adult  Goal: Free from fall injury  5/13/2022 2322 by Wander Keys RN  Outcome: Progressing  5/13/2022 1454 by Fermín Pena RN  Outcome: Progressing     Problem: Skin/Tissue Integrity  Goal: Absence of new skin breakdown  Description: 1. Monitor for areas of redness and/or skin breakdown  2. Assess vascular access sites hourly  3. Every 4-6 hours minimum:  Change oxygen saturation probe site  4. Every 4-6 hours:  If on nasal continuous positive airway pressure, respiratory therapy assess nares and determine need for appliance change or resting period.   5/13/2022 2322 by Wander Keys RN  Outcome: Progressing  5/13/2022 1454 by Fermín Pena RN  Outcome: Progressing

## 2022-05-14 NOTE — PROGRESS NOTES
Cardiology Progress Note     Today's Plan:CPM    Admit Date:  5/13/2022    Consult reason/ Seen today for: syncope/SOB    Subjective and  Overnight Events:  No more syncopal episodes. Assessment / Plan / Recommendation:     1. Syncope: ?dehydration vs vasovagal. Check orthostatics, stress test on Monday. Echo WNL. 2. SOB: normal BNP, echo normal, CXR-normal.   3. HTN:controlled, continue current medications  4. Dyslipidemia: continue with Lovaza and coenzyme Q10  5. ATUL/ CKD nephrology following  6. ETOH abuse   7. DVT prophylaxis if not contraindicated. Electronically signed by KELI Stout CNP on 5/14/2022 at 12:01 PM

## 2022-05-14 NOTE — PROGRESS NOTES
Nephrology Progress Note  5/14/2022 11:28 AM  Subjective: Interval History: Maria Eugenia Cm is a 80 y.o. male appears doing okay today somewhat tired and weak        Data:   Scheduled Meds:   sodium chloride flush  5-40 mL IntraVENous 2 times per day    heparin (porcine)  5,000 Units SubCUTAneous TID    aspirin  81 mg Oral Daily    citalopram  20 mg Oral Nightly    coenzyme Q10  100 mg Oral Nightly    [Held by provider] fenofibrate  54 mg Oral Daily    gemfibrozil  600 mg Oral BID AC    omega-3 acid ethyl esters  2,000 mg Oral BID    thiamine  100 mg Oral Daily    pantoprazole  40 mg Oral Nightly     Continuous Infusions:   sodium chloride      sodium chloride 100 mL/hr at 05/14/22 0841         CBC   Recent Labs     05/12/22 2115 05/13/22 0622   WBC 3.5* 3.6*   HGB 11.5* 11.3*   HCT 34.4* 33.6*    232      BMP   Recent Labs     05/12/22 2115 05/13/22 0622 05/14/22 0428    139 140   K 3.5 4.4 4.8    102 105   CO2 17* 22 23   PHOS  --   --  3.4   BUN 52* 44* 30*   CREATININE 2.6* 1.8* 1.3     Hepatic:   Recent Labs     05/12/22 2115 05/14/22 0428   AST 22 20   ALT 29 25   BILITOT 0.2 0.3   ALKPHOS 41 41     Troponin: No results for input(s): TROPONINI in the last 72 hours. BNP: No results for input(s): BNP in the last 72 hours. Lipids:   Recent Labs     05/13/22 0622   HDL 22*     ABGs: No results found for: PHART, PO2ART, RNY3HOT  INR: No results for input(s): INR in the last 72 hours.   Renal Labs  Albumin:    Lab Results   Component Value Date    LABALBU 4.7 05/14/2022     Calcium:    Lab Results   Component Value Date    CALCIUM 9.7 05/14/2022     Phosphorus:    Lab Results   Component Value Date    PHOS 3.4 05/14/2022     U/A:    Lab Results   Component Value Date    NITRU NEGATIVE 05/12/2022    COLORU YELLOW 05/12/2022    WBCUA NEGATIVE 05/12/2022    RBCUA NEGATIVE 05/12/2022    MUCUS 1+ 06/26/2013    BACTERIA FEW 05/12/2022    CLARITYU FELIPE GEORGEY 05/12/2022    SPECGRAV 1.025 05/12/2022    UROBILINOGEN 0.2 05/12/2022    BILIRUBINUR NEGATIVE 05/12/2022    BLOODU NEGATIVE 05/12/2022    KETUA NEGATIVE 05/12/2022     ABG:  No results found for: PHART, BWJ9XKF, PO2ART, INR2PRU, BEART, THGBART, AUI8BFY, H8UXQNCI  HgBA1c:    Lab Results   Component Value Date    LABA1C 5.7 01/30/2020     Microalbumen/Creatinine ratio:  No components found for: RUCREAT  TSH:  No results found for: TSH  IRON:  No results found for: IRON  Iron Saturation:  No components found for: PERCENTFE  TIBC:  No results found for: TIBC  FERRITIN:  No results found for: FERRITIN  RPR:  No results found for: RPR  LENCHO:    Lab Results   Component Value Date    LENCHO None Detected 06/24/2021     24 Hour Urine for Creatinine Clearance:  No components found for: CREAT4, UHRS10, UTV10      Objective:   I/O: 05/13 0701 - 05/14 0700  In: 960 [P.O.:960]  Out: -   I/O last 3 completed shifts: In: 960 [P.O.:960]  Out: -   No intake/output data recorded. Vitals: /78   Pulse 76   Temp 97.6 °F (36.4 °C) (Oral)   Resp 18   Ht 5' 10\" (1.778 m)   Wt 210 lb (95.3 kg)   SpO2 95%   BMI 30.13 kg/m²  {  General appearance: awake weak  HEENT: Head: Normal, normocephalic, atraumatic.   Neck: supple, symmetrical, trachea midline  Lungs: diminished breath sounds bilaterally  Heart: S1, S2 normal  Abdomen: abnormal findings:  soft nt  Extremities: edema trace  Neurologic: Mental status: alertness: alert        Assessment and Plan:      IMP:  #1 CKD 3 with acute renal failure from ATN  #2 syncopal episode  #3 history of alcohol use    Plan     #1 creatinine down to 1.3 overall improved stable will monitor  #2 follow-up cardiac work-up and plan for to do stress as outpatient and mental status stable to work on discharge planning both supportive care for now  #3 educated about alcohol use  #4 blood pressure holding stable  We will follow           Shy Arevalo MD, MD

## 2022-05-14 NOTE — PROGRESS NOTES
Pt states he does not want to see Dr. Ya Tripp, Dr. Berneta Jeans is currently on the case and he is ok with him finishing the workup when he is in the hospital; however he wishes to see Dr. Darwin Greenwood at d/c.

## 2022-05-15 PROCEDURE — 6360000002 HC RX W HCPCS: Performed by: INTERNAL MEDICINE

## 2022-05-15 PROCEDURE — 2580000003 HC RX 258: Performed by: INTERNAL MEDICINE

## 2022-05-15 PROCEDURE — 99232 SBSQ HOSP IP/OBS MODERATE 35: CPT | Performed by: INTERNAL MEDICINE

## 2022-05-15 PROCEDURE — APPNB45 APP NON BILLABLE 31-45 MINUTES: Performed by: NURSE PRACTITIONER

## 2022-05-15 PROCEDURE — 1200000000 HC SEMI PRIVATE

## 2022-05-15 PROCEDURE — 94761 N-INVAS EAR/PLS OXIMETRY MLT: CPT

## 2022-05-15 PROCEDURE — 6370000000 HC RX 637 (ALT 250 FOR IP): Performed by: INTERNAL MEDICINE

## 2022-05-15 RX ADMIN — CITALOPRAM 20 MG: 20 TABLET, FILM COATED ORAL at 20:20

## 2022-05-15 RX ADMIN — GEMFIBROZIL 600 MG: 600 TABLET ORAL at 15:26

## 2022-05-15 RX ADMIN — OMEGA-3-ACID ETHYL ESTERS 2000 MG: 1 CAPSULE, LIQUID FILLED ORAL at 20:20

## 2022-05-15 RX ADMIN — ASPIRIN 81 MG 81 MG: 81 TABLET ORAL at 07:43

## 2022-05-15 RX ADMIN — HEPARIN SODIUM 5000 UNITS: 5000 INJECTION INTRAVENOUS; SUBCUTANEOUS at 20:20

## 2022-05-15 RX ADMIN — HEPARIN SODIUM 5000 UNITS: 5000 INJECTION INTRAVENOUS; SUBCUTANEOUS at 15:26

## 2022-05-15 RX ADMIN — SODIUM CHLORIDE, PRESERVATIVE FREE 10 ML: 5 INJECTION INTRAVENOUS at 20:20

## 2022-05-15 RX ADMIN — HEPARIN SODIUM 5000 UNITS: 5000 INJECTION INTRAVENOUS; SUBCUTANEOUS at 07:43

## 2022-05-15 RX ADMIN — Medication 100 MG: at 20:20

## 2022-05-15 RX ADMIN — PANTOPRAZOLE SODIUM 40 MG: 40 TABLET, DELAYED RELEASE ORAL at 20:20

## 2022-05-15 RX ADMIN — OMEGA-3-ACID ETHYL ESTERS 2000 MG: 1 CAPSULE, LIQUID FILLED ORAL at 07:42

## 2022-05-15 RX ADMIN — Medication 100 MG: at 07:43

## 2022-05-15 RX ADMIN — GEMFIBROZIL 600 MG: 600 TABLET ORAL at 05:48

## 2022-05-15 ASSESSMENT — PAIN SCALES - GENERAL
PAINLEVEL_OUTOF10: 0

## 2022-05-15 NOTE — PROGRESS NOTES
Cardiology Progress Note     Today's Plan:CPM    Admit Date:  5/13/2022    Consult reason/ Seen today for: syncope/SOB    Subjective and  Overnight Events:  No more syncopal episodes. Assessment / Plan / Recommendation:     1. Syncope: ?dehydration vs vasovagal. Check orthostatics, stress test on Monday. Echo WNL. 2. SOB: normal BNP, echo normal, CXR-normal.   3. HTN:controlled, continue current medications  4. Dyslipidemia: continue with Lovaza and coenzyme Q10  5. ATUL/ CKD nephrology following  6. ETOH abuse   7. DVT prophylaxis if not contraindicated. Electronically signed by Erick Parry.  KELI Mcclellan CNP on 5/15/2022 at 10:58 AM

## 2022-05-15 NOTE — PLAN OF CARE
Problem: Discharge Planning  Goal: Discharge to home or other facility with appropriate resources  5/15/2022 0022 by Silvio Maldonado RN  Outcome: Progressing  5/14/2022 1113 by Joey Jessica RN  Outcome: Progressing     Problem: Safety - Adult  Goal: Free from fall injury  5/15/2022 0022 by Silvio Maldonado RN  Outcome: Progressing  5/14/2022 1113 by Joey Jessica RN  Outcome: Progressing     Problem: Skin/Tissue Integrity  Goal: Absence of new skin breakdown  Description: 1. Monitor for areas of redness and/or skin breakdown  2. Assess vascular access sites hourly  3. Every 4-6 hours minimum:  Change oxygen saturation probe site  4. Every 4-6 hours:  If on nasal continuous positive airway pressure, respiratory therapy assess nares and determine need for appliance change or resting period.   5/15/2022 0022 by Silvio Maldonado RN  Outcome: Progressing  5/14/2022 1113 by Joey Jessica RN  Outcome: Progressing

## 2022-05-15 NOTE — PROGRESS NOTES
05/12/2022    UROBILINOGEN 0.2 05/12/2022    BILIRUBINUR NEGATIVE 05/12/2022    BLOODU NEGATIVE 05/12/2022    KETUA NEGATIVE 05/12/2022     ABG:  No results found for: PHART, IPO4AER, PO2ART, IGD6JPB, BEART, THGBART, FRW5RFQ, J0GQXZWS  HgBA1c:    Lab Results   Component Value Date    LABA1C 5.7 01/30/2020     Microalbumen/Creatinine ratio:  No components found for: RUCREAT  TSH:  No results found for: TSH  IRON:  No results found for: IRON  Iron Saturation:  No components found for: PERCENTFE  TIBC:  No results found for: TIBC  FERRITIN:  No results found for: FERRITIN  RPR:  No results found for: RPR  LENCHO:    Lab Results   Component Value Date    LENCHO None Detected 06/24/2021     24 Hour Urine for Creatinine Clearance:  No components found for: CREAT4, UHRS10, UTV10      Objective:   I/O: 05/14 0701 - 05/15 0700  In: 480 [P.O.:480]  Out: -   I/O last 3 completed shifts: In: 480 [P.O.:480]  Out: -   I/O this shift:  In: 240 [P.O.:240]  Out: -   Vitals: BP (!) 154/81   Pulse 65   Temp 97.9 °F (36.6 °C) (Oral)   Resp 16   Ht 5' 10\" (1.778 m)   Wt 210 lb (95.3 kg)   SpO2 95%   BMI 30.13 kg/m²  {  General appearance: awake weak  HEENT: Head: Normal, normocephalic, atraumatic.   Neck: supple, symmetrical, trachea midline  Lungs: diminished breath sounds bilaterally  Heart: S1, S2 normal  Abdomen: abnormal findings:  soft nt  Extremities: edema trace  Neurologic: Mental status: alertness: alert        Assessment and Plan:      IMP:  #1 CKD 3 with acute renal failure from ATN  #2 syncopal episode  #3 history of alcohol use    Plan     #1 renal function to baseline we will monitor  #2 monitor neuro status appears stable  #3 note appearing DTs we will follow  Monitor blood pressure somewhat anxious and stop IV fluids         Berkley Alan MD, MD

## 2022-05-15 NOTE — PROGRESS NOTES
Today's plan: IVF is discontinued creatinine has treaded down, stress test on Monday, his echo was normal, his syncope was most likely from acute renal failure and dehydration continue to hold lisinopril and hydrochlorothiazide     Admit Date:  5/13/2022    Subjective: Okay      Chief complaints on admission syncope      History of present Kailee Stubbs is a 80 y. o.year old who  presents with had no chief complaint listed for this encounter. Past medical history:    has a past medical history of Esophageal varices ([address] H/O Doppler ultrasound, H/O echocardiogram, H/O exercise stress test, Hyperlipidemia, Hypertension, and Hypertriglyceridemia. Past surgical history:   has a past surgical history that includes knee surgery (Left); Cholecystectomy; Appendectomy; Cardiac catheterization; Colonoscopy; Colonoscopy (07/10/2017); Endoscopy, colon, diagnostic; and Endoscopy, colon, diagnostic (07/10/2017). Social History:   reports that he has quit smoking. He has quit using smokeless tobacco. He reports current alcohol use of about 4.0 standard drinks of alcohol per week. He reports that he does not use drugs. Family history:  family history is not on file. Allergies   Allergen Reactions    Lipitor [Atorvastatin] Other (See Comments)     Severe muscle aches on low-dose of 10 mg daily. Objective:   BP (!) 154/81   Pulse 65   Temp 97.9 °F (36.6 °C) (Oral)   Resp 16   Ht 5' 10\" (1.778 m)   Wt 210 lb (95.3 kg)   SpO2 95%   BMI 30.13 kg/m²       Intake/Output Summary (Last 24 hours) at 5/15/2022 1518  Last data filed at 5/15/2022 0853  Gross per 24 hour   Intake 240 ml   Output --   Net 240 ml            Physical Exam:  Constitutional:  Well developed, Well nourished, No acute distress, Non-toxic appearance. HENT:  Normocephalic, Atraumatic, Bilateral external ears normal, Oropharynx moist, No oral exudates, Nose normal. Neck- Normal range of motion, No tenderness, Supple, No stridor.    Eyes: PERRL, EOMI, Conjunctiva normal, No discharge. Respiratory:  Normal breath sounds, No respiratory distress, No wheezing, No chest tenderness. ,no use of accessory muscles, diaphragm movement is normal  Cardiovascular: (PMI) apex non displaced,no lifts no thrills, no s3,no s4, Normal heart rate, Normal rhythm, No murmurs, No rubs, No gallops. Carotid arteries pulse and amplitude are normal no bruit, no abdominal bruit noted ( normal abdominal aorta ausculation), femoral arteries pulse and amplitude are normal no bruit, pedal pulses are normal  GI:  Bowel sounds normal, Soft, No tenderness, No masses, No pulsatile masses. : External genitalia appear normal, No masses or lesions. No discharge. No CVA tenderness. Musculoskeletal:  Intact distal pulses, No edema, No tenderness, No cyanosis, No clubbing. Good range of motion in all major joints. No tenderness to palpation or major deformities noted. Back- No tenderness. Integument:  Warm, Dry, No erythema, No rash. Lymphatic:  No lymphadenopathy noted. Neurologic:  Alert & oriented x 3, Normal motor function, Normal sensory function, No focal deficits noted.    Psychiatric:  Affect normal, Judgment normal, Mood normal.     Medications:    sodium chloride flush  5-40 mL IntraVENous 2 times per day    heparin (porcine)  5,000 Units SubCUTAneous TID    aspirin  81 mg Oral Daily    citalopram  20 mg Oral Nightly    coenzyme Q10  100 mg Oral Nightly    [Held by provider] fenofibrate  54 mg Oral Daily    gemfibrozil  600 mg Oral BID AC    omega-3 acid ethyl esters  2,000 mg Oral BID    thiamine  100 mg Oral Daily    pantoprazole  40 mg Oral Nightly      sodium chloride       sodium chloride flush, sodium chloride, ondansetron **OR** ondansetron, polyethylene glycol, acetaminophen **OR** acetaminophen    Lab Data:  CBC:   Recent Labs     05/12/22  2115 05/13/22  0622   WBC 3.5* 3.6*   HGB 11.5* 11.3*   HCT 34.4* 33.6*   MCV 91.7 91.8    232     BMP: Recent Labs     05/12/22 2115 05/13/22  0622 05/14/22  0428    139 140   K 3.5 4.4 4.8    102 105   CO2 17* 22 23   PHOS  --   --  3.4   BUN 52* 44* 30*   CREATININE 2.6* 1.8* 1.3     LIVER PROFILE:   Recent Labs     05/12/22 2115 05/14/22  0428   AST 22 20   ALT 29 25   LIPASE 80*  --    BILITOT 0.2 0.3   ALKPHOS 41 41     PT/INR: No results for input(s): PROTIME, INR in the last 72 hours. APTT: No results for input(s): APTT in the last 72 hours. BNP:  No results for input(s): BNP in the last 72 hours. TROPONIN: @TROPONINI:3@      Assessment:  80 y. o.year old who is admitted for          Plan:   as mentioned above from dehydration and acute renal failure, his blood pressure is stable his creatinine is normal now we will continue to hold hydrochlorothiazide and lisinopril continue IV fluids  renal failure resolved on IV fluids continue with  Hypertension controlled off hydrochlorothiazide and lisinopril  History of alcohol abuse stable  All labs, medications and tests reviewed, continue all other medications of all above medical condition listed as is.       Edin Hurt MD, MD 5/15/2022 3:18 PM

## 2022-05-15 NOTE — PROGRESS NOTES
V2.0  [facility]      Name:  Camilla Franco /Age/Sex: 1940  (80 y.o. male)   MRN & CSN:  [id number] & [id number] Encounter Date/Time: 5/15/2022 10:43 AM EDT    Location:  [address] [place] [address]-A PCP: Janneth Bender, [address] [facility] Day: 3    Assessment and Plan:   Camilla Franco is a 80 y.o. male with pmh of hypertension, hyperlipidemia, chronic kidney disease stage III and chronic alcohol use who presents with a fall (which could be syncope) and hypoxia with shortness of breath on exertion. 1.  Syncope/fall: Possibly due to orthostatic hypotension from volume depletion (mowed lawn all day in the hot summer weather). CT head, facial bone, C-spine and abdomen/pelvis-no acute findings. Troponin is negative. Echo-EF is 50%, no significant valvular abnormality. Cardiology consult appreciated-stress test in a.m.    2.  Acute on chronic kidney disease stage III: Due to dehydration. Holding lisinopril/HCTZ. Improved to IV fluid hydration. Nephrology consult appreciated. 3.  Chronic alcohol use: CIWA protocol. No evidence of alcohol withdrawal.    Chronic problems include hypertension, hyperlipidemia and obesity. DVT prophylaxis: Heparin  Disposition: He lives with his wife. No need for HHC. Diet ADULT DIET; Regular; Low Fat/Low Chol/High Fiber/2 gm Na   DVT Prophylaxis [] Lovenox, [x]  Heparin, [] SCDs, [] Ambulation,  [] Eliquis, [] Xarelto  [] Coumadin   Code Status Full Code   Disposition From: Home  Expected Disposition: Home  Estimated Date of Discharge: 1 to 2 days  Patient requires continued admission due to syncope   Surrogate Decision Maker/ POA      Subjective:     Chief Complaint: No chief complaint on file. Camilla Franco is a 80 y.o. male who presents with fall/syncope and shortness of breath on exertion. No shortness of breath at rest.  He is still having mild shortness of breath on exertion. No palpitation or chest pain this morning.     Review of Systems:    Review of Systems    Nothing significant    Objective: Intake/Output Summary (Last 24 hours) at 5/15/2022 1043  Last data filed at 5/15/2022 0853  Gross per 24 hour   Intake 480 ml   Output --   Net 480 ml        Vitals:   Vitals:    05/15/22 0740   BP: (!) 154/81   Pulse: 65   Resp: 16   Temp: 97.9 °F (36.6 °C)   SpO2: 95%       Physical Exam:   General: No apparent respiratory distress. Eyes: EOMI. No pallor. Anicteric. ENT: neck supple  Cardiovascular: Regular rate. No murmur or S3. Respiratory: Clear to auscultation  Gastrointestinal: Soft, non tender, no palpable mass. Genitourinary: no suprapubic tenderness  Musculoskeletal: No edema  Skin: warm, dry  Neuro: Alert. Oriented x3. No gross focal weakness. Psych: Mood appropriate. Medications:   Medications:    sodium chloride flush  5-40 mL IntraVENous 2 times per day    heparin (porcine)  5,000 Units SubCUTAneous TID    aspirin  81 mg Oral Daily    citalopram  20 mg Oral Nightly    coenzyme Q10  100 mg Oral Nightly    [Held by provider] fenofibrate  54 mg Oral Daily    gemfibrozil  600 mg Oral BID AC    omega-3 acid ethyl esters  2,000 mg Oral BID    thiamine  100 mg Oral Daily    pantoprazole  40 mg Oral Nightly      Infusions:    sodium chloride       PRN Meds: sodium chloride flush, 5-40 mL, PRN  sodium chloride, , PRN  ondansetron, 4 mg, Q8H PRN   Or  ondansetron, 4 mg, Q6H PRN  polyethylene glycol, 17 g, Daily PRN  acetaminophen, 650 mg, Q6H PRN   Or  acetaminophen, 650 mg, Q6H PRN        Labs    No results found for this or any previous visit (from the past 24 hour(s)). Imaging/Diagnostics Last 24 Hours   No results found.     Electronically signed by Luke Wood MD on 5/15/2022 at 10:43 AM

## 2022-05-15 NOTE — PLAN OF CARE
Problem: Discharge Planning  Goal: Discharge to home or other facility with appropriate resources  5/15/2022 1040 by Ashley Francis RN  Outcome: Progressing  5/15/2022 0022 by Felisa Nava RN  Outcome: Progressing     Problem: Safety - Adult  Goal: Free from fall injury  5/15/2022 1040 by Ashely Francis RN  Outcome: Progressing  Flowsheets (Taken 5/15/2022 1039)  Free From Fall Injury:   Jenny Garduno family/caregiver on patient safety   Based on caregiver fall risk screen, instruct family/caregiver to ask for assistance with transferring infant if caregiver noted to have fall risk factors  5/15/2022 0022 by Fleisa Nava RN  Outcome: Progressing     Problem: Skin/Tissue Integrity  Goal: Absence of new skin breakdown  Description: 1. Monitor for areas of redness and/or skin breakdown  2. Assess vascular access sites hourly  3. Every 4-6 hours minimum:  Change oxygen saturation probe site  4. Every 4-6 hours:  If on nasal continuous positive airway pressure, respiratory therapy assess nares and determine need for appliance change or resting period.   5/15/2022 1040 by Ashley Francis RN  Outcome: Progressing  5/15/2022 0022 by Felisa Nava RN  Outcome: Progressing     Problem: ABCDS Injury Assessment  Goal: Absence of physical injury  Outcome: Progressing

## 2022-05-16 ENCOUNTER — APPOINTMENT (OUTPATIENT)
Dept: NUCLEAR MEDICINE | Age: 82
DRG: 286 | End: 2022-05-16
Attending: INTERNAL MEDICINE
Payer: MEDICARE

## 2022-05-16 PROBLEM — R94.39 ABNORMAL NUCLEAR STRESS TEST: Status: ACTIVE | Noted: 2022-05-16

## 2022-05-16 LAB
ANION GAP SERPL CALCULATED.3IONS-SCNC: 17 MMOL/L (ref 4–16)
BUN BLDV-MCNC: 28 MG/DL (ref 6–23)
CALCIUM SERPL-MCNC: 10.1 MG/DL (ref 8.3–10.6)
CHLORIDE BLD-SCNC: 99 MMOL/L (ref 99–110)
CO2: 20 MMOL/L (ref 21–32)
CREAT SERPL-MCNC: 1.2 MG/DL (ref 0.9–1.3)
GFR AFRICAN AMERICAN: >60 ML/MIN/1.73M2
GFR NON-AFRICAN AMERICAN: 58 ML/MIN/1.73M2
GLUCOSE BLD-MCNC: 120 MG/DL (ref 70–99)
LV EF: 45 %
LVEF MODALITY: NORMAL
POTASSIUM SERPL-SCNC: 4.4 MMOL/L (ref 3.5–5.1)
SODIUM BLD-SCNC: 136 MMOL/L (ref 135–145)

## 2022-05-16 PROCEDURE — 2709999900 HC NON-CHARGEABLE SUPPLY

## 2022-05-16 PROCEDURE — 3430000000 HC RX DIAGNOSTIC RADIOPHARMACEUTICAL: Performed by: INTERNAL MEDICINE

## 2022-05-16 PROCEDURE — 6370000000 HC RX 637 (ALT 250 FOR IP): Performed by: INTERNAL MEDICINE

## 2022-05-16 PROCEDURE — C1894 INTRO/SHEATH, NON-LASER: HCPCS

## 2022-05-16 PROCEDURE — 80048 BASIC METABOLIC PNL TOTAL CA: CPT

## 2022-05-16 PROCEDURE — 2500000003 HC RX 250 WO HCPCS

## 2022-05-16 PROCEDURE — 93458 L HRT ARTERY/VENTRICLE ANGIO: CPT

## 2022-05-16 PROCEDURE — 6360000002 HC RX W HCPCS: Performed by: INTERNAL MEDICINE

## 2022-05-16 PROCEDURE — 2580000003 HC RX 258: Performed by: INTERNAL MEDICINE

## 2022-05-16 PROCEDURE — A9500 TC99M SESTAMIBI: HCPCS | Performed by: INTERNAL MEDICINE

## 2022-05-16 PROCEDURE — 4A023N7 MEASUREMENT OF CARDIAC SAMPLING AND PRESSURE, LEFT HEART, PERCUTANEOUS APPROACH: ICD-10-PCS | Performed by: INTERNAL MEDICINE

## 2022-05-16 PROCEDURE — 93458 L HRT ARTERY/VENTRICLE ANGIO: CPT | Performed by: INTERNAL MEDICINE

## 2022-05-16 PROCEDURE — 6360000004 HC RX CONTRAST MEDICATION

## 2022-05-16 PROCEDURE — 36415 COLL VENOUS BLD VENIPUNCTURE: CPT

## 2022-05-16 PROCEDURE — 78452 HT MUSCLE IMAGE SPECT MULT: CPT

## 2022-05-16 PROCEDURE — B2111ZZ FLUOROSCOPY OF MULTIPLE CORONARY ARTERIES USING LOW OSMOLAR CONTRAST: ICD-10-PCS | Performed by: INTERNAL MEDICINE

## 2022-05-16 PROCEDURE — 93017 CV STRESS TEST TRACING ONLY: CPT

## 2022-05-16 PROCEDURE — 1200000000 HC SEMI PRIVATE

## 2022-05-16 PROCEDURE — 94761 N-INVAS EAR/PLS OXIMETRY MLT: CPT

## 2022-05-16 PROCEDURE — 99233 SBSQ HOSP IP/OBS HIGH 50: CPT | Performed by: INTERNAL MEDICINE

## 2022-05-16 PROCEDURE — 6360000002 HC RX W HCPCS

## 2022-05-16 RX ORDER — SODIUM CHLORIDE 9 MG/ML
INJECTION, SOLUTION INTRAVENOUS CONTINUOUS
Status: DISCONTINUED | OUTPATIENT
Start: 2022-05-16 | End: 2022-05-17

## 2022-05-16 RX ADMIN — KIT FOR THE PREPARATION OF TECHNETIUM TC99M SESTAMIBI 10 MILLICURIE: 1 INJECTION, POWDER, LYOPHILIZED, FOR SOLUTION PARENTERAL at 11:17

## 2022-05-16 RX ADMIN — CITALOPRAM 20 MG: 20 TABLET, FILM COATED ORAL at 20:50

## 2022-05-16 RX ADMIN — SODIUM CHLORIDE: 9 INJECTION, SOLUTION INTRAVENOUS at 20:56

## 2022-05-16 RX ADMIN — HEPARIN SODIUM 5000 UNITS: 5000 INJECTION INTRAVENOUS; SUBCUTANEOUS at 20:49

## 2022-05-16 RX ADMIN — Medication 100 MG: at 20:50

## 2022-05-16 RX ADMIN — GEMFIBROZIL 600 MG: 600 TABLET ORAL at 16:31

## 2022-05-16 RX ADMIN — REGADENOSON 0.4 MG: 0.08 INJECTION, SOLUTION INTRAVENOUS at 09:45

## 2022-05-16 RX ADMIN — KIT FOR THE PREPARATION OF TECHNETIUM TC99M SESTAMIBI 30 MILLICURIE: 1 INJECTION, POWDER, LYOPHILIZED, FOR SOLUTION PARENTERAL at 11:17

## 2022-05-16 RX ADMIN — PANTOPRAZOLE SODIUM 40 MG: 40 TABLET, DELAYED RELEASE ORAL at 20:50

## 2022-05-16 RX ADMIN — OMEGA-3-ACID ETHYL ESTERS 2000 MG: 1 CAPSULE, LIQUID FILLED ORAL at 20:50

## 2022-05-16 NOTE — PROGRESS NOTES
Today's plan:  [facility] scheduled,risk and benefit explained and consent obtained, CASE discussed with Dr. Krista Owen, [facility] TO proceed with [facility], start NS @ 76 cc.hr      Admit Date:  5/13/2022    Subjective: Okay      Chief complaints on admission syncope      History of present Yessenia Lora is a 80 y. o.year old who  presents with had no chief complaint listed for this encounter. Past medical history:    has a past medical history of Esophageal varices ([address] H/O Doppler ultrasound, H/O echocardiogram, H/O exercise stress test, Hyperlipidemia, Hypertension, and Hypertriglyceridemia. Past surgical history:   has a past surgical history that includes knee surgery (Left); Cholecystectomy; Appendectomy; Cardiac catheterization; Colonoscopy; Colonoscopy (07/10/2017); Endoscopy, colon, diagnostic; and Endoscopy, colon, diagnostic (07/10/2017). Social History:   reports that he has quit smoking. He has quit using smokeless tobacco. He reports current alcohol use of about 4.0 standard drinks of alcohol per week. He reports that he does not use drugs. Family history:  family history is not on file. Allergies   Allergen Reactions    Lipitor [Atorvastatin] Other (See Comments)     Severe muscle aches on low-dose of 10 mg daily. Objective:   BP (!) 167/92   Pulse 69   Temp 97.6 °F (36.4 °C) (Oral)   Resp 16   Ht 5' 10\" (1.778 m)   Wt 210 lb (95.3 kg)   SpO2 96%   BMI 30.13 kg/m²     No intake or output data in the 24 hours ending 05/16/22 1314         Physical Exam:  Constitutional:  Well developed, Well nourished, No acute distress, Non-toxic appearance. HENT:  Normocephalic, Atraumatic, Bilateral external ears normal, Oropharynx moist, No oral exudates, Nose normal. Neck- Normal range of motion, No tenderness, Supple, No stridor. Eyes:  PERRL, EOMI, Conjunctiva normal, No discharge. Respiratory:  Normal breath sounds, No respiratory distress, No wheezing, No chest tenderness. ,no use of accessory muscles, diaphragm movement is normal  Cardiovascular: (PMI) apex non displaced,no lifts no thrills, no s3,no s4, Normal heart rate, Normal rhythm, No murmurs, No rubs, No gallops. Carotid arteries pulse and amplitude are normal no bruit, no abdominal bruit noted ( normal abdominal aorta ausculation), femoral arteries pulse and amplitude are normal no bruit, pedal pulses are normal  GI:  Bowel sounds normal, Soft, No tenderness, No masses, No pulsatile masses. : External genitalia appear normal, No masses or lesions. No discharge. No CVA tenderness. Musculoskeletal:  Intact distal pulses, No edema, No tenderness, No cyanosis, No clubbing. Good range of motion in all major joints. No tenderness to palpation or major deformities noted. Back- No tenderness. Integument:  Warm, Dry, No erythema, No rash. Lymphatic:  No lymphadenopathy noted. Neurologic:  Alert & oriented x 3, Normal motor function, Normal sensory function, No focal deficits noted. Psychiatric:  Affect normal, Judgment normal, Mood normal.     Medications:    sodium chloride flush  5-40 mL IntraVENous 2 times per day    heparin (porcine)  5,000 Units SubCUTAneous TID    aspirin  81 mg Oral Daily    citalopram  20 mg Oral Nightly    coenzyme Q10  100 mg Oral Nightly    [Held by provider] fenofibrate  54 mg Oral Daily    gemfibrozil  600 mg Oral BID AC    omega-3 acid ethyl esters  2,000 mg Oral BID    thiamine  100 mg Oral Daily    pantoprazole  40 mg Oral Nightly      sodium chloride       sodium chloride flush, sodium chloride, ondansetron **OR** ondansetron, polyethylene glycol, acetaminophen **OR** acetaminophen    Lab Data:  CBC:   No results for input(s): WBC, HGB, HCT, MCV, PLT in the last 72 hours.   BMP:   Recent Labs     05/14/22 0428      K 4.8      CO2 23   PHOS 3.4   BUN 30*   CREATININE 1.3     LIVER PROFILE:   Recent Labs     05/14/22 0428   AST 20   ALT 25   BILITOT 0.3   ALKPHOS 41     PT/INR: No results for input(s): PROTIME, INR in the last 72 hours. APTT: No results for input(s): APTT in the last 72 hours. BNP:  No results for input(s): BNP in the last 72 hours. TROPONIN: @TROPONINI:3@      Assessment:  80 y. o.year old who is admitted for          Plan:   as mentioned above from dehydration and acute renal failure, his blood pressure is stable his creatinine is normal now we will continue to hold hydrochlorothiazide and lisinopril continue IV fluids  renal failure resolved on IV fluids continue with  Hypertension controlled off hydrochlorothiazide and lisinopril  History of alcohol abuse stable  All labs, medications and tests reviewed, continue all other medications of all above medical condition listed as is.       Ann Hodges MD, MD 5/16/2022 1:14 PM

## 2022-05-16 NOTE — PROGRESS NOTES
Nephrology Progress Note  5/16/2022 10:41 AM        Subjective:   Admit Date: 5/13/2022  PCP: Janeen Rinne ADAMS, APRN - CNP    Interval History:  waiting for his stress test today    Diet:  n.p.o.    ROS:   no PND or orthopnea   no chest pain    Data:     Current meds:    sodium chloride flush  5-40 mL IntraVENous 2 times per day    heparin (porcine)  5,000 Units SubCUTAneous TID    aspirin  81 mg Oral Daily    citalopram  20 mg Oral Nightly    coenzyme Q10  100 mg Oral Nightly    [Held by provider] fenofibrate  54 mg Oral Daily    gemfibrozil  600 mg Oral BID AC    omega-3 acid ethyl esters  2,000 mg Oral BID    thiamine  100 mg Oral Daily    pantoprazole  40 mg Oral Nightly      sodium chloride           I/O last 3 completed shifts: In: 240 [P.O.:240]  Out: -     CBC: No results for input(s): WBC, HGB, PLT in the last 72 hours. Recent Labs     05/14/22  0428      K 4.8      CO2 23   BUN 30*   CREATININE 1.3   GLUCOSE 100*       Lab Results   Component Value Date    CALCIUM 9.7 05/14/2022    PHOS 3.4 05/14/2022       Objective:     Vitals: BP (!) 167/92   Pulse 69   Temp 97.6 °F (36.4 °C) (Oral)   Resp 16   Ht 5' 10\" (1.778 m)   Wt 210 lb (95.3 kg)   SpO2 96%   BMI 30.13 kg/m²     General appearance:   alert, awake and oriented this morning  HEENT:   no conjunctival pallor, he is edematous  Neck:   supple  Lungs:   no crackles on auscultation  Heart:   regular rate and rhythm  Abdomen:  soft, nontender  Extremities:   no edema      Problem List :         Impression :     1.  stage II  acute kidney- recovering likely from volume depletion, associated hypotension, I do not see any labs since May 13, 2022- he may have some structural damage  2.  dyspnea and exertion with risk factor for coronary artery disease, getting a noninvasive work-up  3.  chronic hypertension, dyslipidemia    Recommendation/Plan  :     1.  advised to abstain from alcohol completely  2.  low-salt, DASH diet  3. restart ACE inhibitor's and thiazide-  4.  goal blood pressure 120/80  5.   from kidney standpoint he can be discharged  6. . CMP, magnesium and phosphorus in 1 week  7.  follow-up with me in 2 weeks      Alvaro Corral MD MD

## 2022-05-16 NOTE — PLAN OF CARE
Problem: Discharge Planning  Goal: Discharge to home or other facility with appropriate resources  5/16/2022 1011 by Henry Braxton LPN  Outcome: Progressing  5/15/2022 2322 by Wander Keys RN  Outcome: Progressing  Flowsheets (Taken 5/15/2022 1042 by Darwin Foster RN)  Discharge to home or other facility with appropriate resources:   Identify barriers to discharge with patient and caregiver   Arrange for needed discharge resources and transportation as appropriate   Identify discharge learning needs (meds, wound care, etc)   Arrange for interpreters to assist at discharge as needed   Refer to discharge planning if patient needs post-hospital services based on physician order or complex needs related to functional status, cognitive ability or social support system     Problem: Safety - Adult  Goal: Free from fall injury  5/16/2022 1011 by Henry Braxton LPN  Outcome: Progressing  5/15/2022 2322 by Wander Keys RN  Outcome: Progressing     Problem: Skin/Tissue Integrity  Goal: Absence of new skin breakdown  Description: 1. Monitor for areas of redness and/or skin breakdown  2. Assess vascular access sites hourly  3. Every 4-6 hours minimum:  Change oxygen saturation probe site  4. Every 4-6 hours:  If on nasal continuous positive airway pressure, respiratory therapy assess nares and determine need for appliance change or resting period.   5/16/2022 1011 by Henry Braxton LPN  Outcome: Progressing  5/15/2022 2322 by Wander Keys RN  Outcome: Progressing     Problem: ABCDS Injury Assessment  Goal: Absence of physical injury  5/16/2022 1011 by Henry Braxton LPN  Outcome: Progressing  5/15/2022 2322 by Wander Keys RN  Outcome: Progressing     Problem: Pain  Goal: Verbalizes/displays adequate comfort level or baseline comfort level  5/16/2022 1011 by Henry Braxton LPN  Outcome: Progressing  5/15/2022 2322 by Wander Keys RN  Outcome: Progressing

## 2022-05-16 NOTE — PROGRESS NOTES
No dizziness this morning. Review of Systems:    Review of Systems    Nothing significant    Objective:     No intake or output data in the 24 hours ending 05/16/22 1421     Vitals:   Vitals:    05/16/22 0746   BP: (!) 167/92   Pulse: 69   Resp:    Temp:    SpO2: 96%       Physical Exam:   General: No apparent respiratory distress. Eyes: EOMI. No pallor. Anicteric. ENT: neck supple  Cardiovascular: Regular rate. No murmur or S3. Respiratory: Clear to auscultation  Gastrointestinal: Soft, non tender, no palpable mass. Genitourinary: no suprapubic tenderness  Musculoskeletal: No edema  Skin: warm, dry  Neuro: Alert. Oriented x3. No gross focal weakness. Psych: Mood appropriate. Medications:   Medications:    sodium chloride flush  5-40 mL IntraVENous 2 times per day    heparin (porcine)  5,000 Units SubCUTAneous TID    aspirin  81 mg Oral Daily    citalopram  20 mg Oral Nightly    coenzyme Q10  100 mg Oral Nightly    [Held by provider] fenofibrate  54 mg Oral Daily    gemfibrozil  600 mg Oral BID AC    omega-3 acid ethyl esters  2,000 mg Oral BID    thiamine  100 mg Oral Daily    pantoprazole  40 mg Oral Nightly      Infusions:    sodium chloride      sodium chloride       PRN Meds: sodium chloride flush, 5-40 mL, PRN  sodium chloride, , PRN  ondansetron, 4 mg, Q8H PRN   Or  ondansetron, 4 mg, Q6H PRN  polyethylene glycol, 17 g, Daily PRN  acetaminophen, 650 mg, Q6H PRN   Or  acetaminophen, 650 mg, Q6H PRN        Labs    No results found for this or any previous visit (from the past 24 hour(s)). Imaging/Diagnostics Last 24 Hours   No results found.     Electronically signed by Ana Maria Fernández MD on 5/16/2022 at 2:21 PM

## 2022-05-16 NOTE — PLAN OF CARE
Problem: Discharge Planning  Goal: Discharge to home or other facility with appropriate resources  5/15/2022 2322 by Tiffanie Washington RN  Outcome: Progressing  Flowsheets (Taken 5/15/2022 1042 by Georgie Em RN)  Discharge to home or other facility with appropriate resources:   Identify barriers to discharge with patient and caregiver   Arrange for needed discharge resources and transportation as appropriate   Identify discharge learning needs (meds, wound care, etc)   Arrange for interpreters to assist at discharge as needed   Refer to discharge planning if patient needs post-hospital services based on physician order or complex needs related to functional status, cognitive ability or social support system  5/15/2022 1040 by Georgie Em RN  Outcome: Progressing     Problem: Safety - Adult  Goal: Free from fall injury  5/15/2022 2322 by Tiffanie Washington RN  Outcome: Progressing  5/15/2022 1040 by Georgie Em RN  Outcome: Progressing  Flowsheets (Taken 5/15/2022 1039)  Free From Fall Injury:   Instruct family/caregiver on patient safety   Based on caregiver fall risk screen, instruct family/caregiver to ask for assistance with transferring infant if caregiver noted to have fall risk factors     Problem: Skin/Tissue Integrity  Goal: Absence of new skin breakdown  Description: 1. Monitor for areas of redness and/or skin breakdown  2. Assess vascular access sites hourly  3. Every 4-6 hours minimum:  Change oxygen saturation probe site  4. Every 4-6 hours:  If on nasal continuous positive airway pressure, respiratory therapy assess nares and determine need for appliance change or resting period.   5/15/2022 2322 by Tiffanie Washington RN  Outcome: Progressing  5/15/2022 1040 by Georgie Em RN  Outcome: Progressing     Problem: ABCDS Injury Assessment  Goal: Absence of physical injury  5/15/2022 2322 by Tiffanie Washington RN  Outcome: Progressing  5/15/2022 1040 by Georgie Em RN  Outcome: Progressing     Problem: Pain  Goal: Verbalizes/displays adequate comfort level or baseline comfort level  Outcome: Progressing

## 2022-05-17 VITALS
DIASTOLIC BLOOD PRESSURE: 88 MMHG | HEART RATE: 78 BPM | SYSTOLIC BLOOD PRESSURE: 136 MMHG | TEMPERATURE: 97.9 F | RESPIRATION RATE: 16 BRPM | BODY MASS INDEX: 30.06 KG/M2 | WEIGHT: 210 LBS | HEIGHT: 70 IN | OXYGEN SATURATION: 95 %

## 2022-05-17 LAB
ANION GAP SERPL CALCULATED.3IONS-SCNC: 14 MMOL/L (ref 4–16)
BUN BLDV-MCNC: 26 MG/DL (ref 6–23)
CALCIUM SERPL-MCNC: 9.5 MG/DL (ref 8.3–10.6)
CHLORIDE BLD-SCNC: 104 MMOL/L (ref 99–110)
CO2: 20 MMOL/L (ref 21–32)
CREAT SERPL-MCNC: 1.1 MG/DL (ref 0.9–1.3)
GFR AFRICAN AMERICAN: >60 ML/MIN/1.73M2
GFR NON-AFRICAN AMERICAN: >60 ML/MIN/1.73M2
GLUCOSE BLD-MCNC: 106 MG/DL (ref 70–99)
HCT VFR BLD CALC: 35.5 % (ref 42–52)
HEMOGLOBIN: 11.8 GM/DL (ref 13.5–18)
MCH RBC QN AUTO: 30.9 PG (ref 27–31)
MCHC RBC AUTO-ENTMCNC: 33.2 % (ref 32–36)
MCV RBC AUTO: 92.9 FL (ref 78–100)
PDW BLD-RTO: 12.8 % (ref 11.7–14.9)
PLATELET # BLD: 242 K/CU MM (ref 140–440)
PMV BLD AUTO: 9 FL (ref 7.5–11.1)
POTASSIUM SERPL-SCNC: 4.2 MMOL/L (ref 3.5–5.1)
RBC # BLD: 3.82 M/CU MM (ref 4.6–6.2)
SODIUM BLD-SCNC: 138 MMOL/L (ref 135–145)
WBC # BLD: 2.9 K/CU MM (ref 4–10.5)

## 2022-05-17 PROCEDURE — 6360000002 HC RX W HCPCS: Performed by: INTERNAL MEDICINE

## 2022-05-17 PROCEDURE — 94761 N-INVAS EAR/PLS OXIMETRY MLT: CPT

## 2022-05-17 PROCEDURE — 2580000003 HC RX 258: Performed by: INTERNAL MEDICINE

## 2022-05-17 PROCEDURE — 80048 BASIC METABOLIC PNL TOTAL CA: CPT

## 2022-05-17 PROCEDURE — 85027 COMPLETE CBC AUTOMATED: CPT

## 2022-05-17 PROCEDURE — 6370000000 HC RX 637 (ALT 250 FOR IP): Performed by: INTERNAL MEDICINE

## 2022-05-17 PROCEDURE — 36415 COLL VENOUS BLD VENIPUNCTURE: CPT

## 2022-05-17 RX ORDER — SODIUM CHLORIDE 0.9 % (FLUSH) 0.9 %
5-40 SYRINGE (ML) INJECTION PRN
Status: DISCONTINUED | OUTPATIENT
Start: 2022-05-17 | End: 2022-05-17 | Stop reason: HOSPADM

## 2022-05-17 RX ORDER — SODIUM CHLORIDE 9 MG/ML
INJECTION, SOLUTION INTRAVENOUS CONTINUOUS
Status: DISPENSED | OUTPATIENT
Start: 2022-05-17 | End: 2022-05-17

## 2022-05-17 RX ORDER — ACETAMINOPHEN 325 MG/1
650 TABLET ORAL EVERY 4 HOURS PRN
Status: DISCONTINUED | OUTPATIENT
Start: 2022-05-17 | End: 2022-05-17 | Stop reason: HOSPADM

## 2022-05-17 RX ORDER — SODIUM CHLORIDE 0.9 % (FLUSH) 0.9 %
5-40 SYRINGE (ML) INJECTION EVERY 12 HOURS SCHEDULED
Status: DISCONTINUED | OUTPATIENT
Start: 2022-05-17 | End: 2022-05-17 | Stop reason: HOSPADM

## 2022-05-17 RX ORDER — SODIUM CHLORIDE 9 MG/ML
INJECTION, SOLUTION INTRAVENOUS PRN
Status: DISCONTINUED | OUTPATIENT
Start: 2022-05-17 | End: 2022-05-17 | Stop reason: HOSPADM

## 2022-05-17 RX ADMIN — SODIUM CHLORIDE, PRESERVATIVE FREE 10 ML: 5 INJECTION INTRAVENOUS at 09:24

## 2022-05-17 RX ADMIN — Medication 100 MG: at 09:12

## 2022-05-17 RX ADMIN — ASPIRIN 81 MG 81 MG: 81 TABLET ORAL at 09:12

## 2022-05-17 RX ADMIN — GEMFIBROZIL 600 MG: 600 TABLET ORAL at 06:31

## 2022-05-17 RX ADMIN — HEPARIN SODIUM 5000 UNITS: 5000 INJECTION INTRAVENOUS; SUBCUTANEOUS at 09:12

## 2022-05-17 RX ADMIN — OMEGA-3-ACID ETHYL ESTERS 2000 MG: 1 CAPSULE, LIQUID FILLED ORAL at 09:12

## 2022-05-17 NOTE — DISCHARGE SUMMARY
V2.0  Discharge Summary    Name:  Shai Meyer /Age/Sex: 1940 (80 y.o. male)   Admit Date: 2022  Discharge Date: 22    MRN & CSN:  [id number] & [id number] Encounter Date and Time 22 3:13 PM EDT    Attending:  No att. providers found Discharging Provider: Chava Gould MD       Hospital Course:     Brief HPI: Shai Meyer is a 80 y.o. male who presented with a fall/syncope and hypoxia with shortness of breath on exertion. Brief Problem Based Course:  Shai Meyer is a 80 y.o. male with hypertension, hypertriglyceridemia, chronic alcohol use who was at [facility] ED after having a fall at home. Patient reports that he was trying to get up from a chair when he felt off balance and fell on his left side hitting his face on the ground. Patient does not think that he lost consciousness, even if. It could have been very brief. Patient's daughter was at home at the time and called EMS. Patient reports that he mowed his lawn and then mowed his neighbors lawn. After that he felt short of breath. He went out for dinner with family and while coming back he had an episode of bowel incontinence. Patient reports that he could not get to the bathroom on time. Patient also noticed that his oxygen saturation was 88% and was using his wife's oxygen today. Patient reports that he has been feeling short of breath with exertion for the last 6-8 months. Denied any chest pain. Denying being sick recently, denied any fever, chills, cough, denied any nausea, vomiting, diarrhea. At presentation patient was noted to have /51, HR 78, RR 18, temp 98, saturating 95% on room air. Lab work significant for CO2 17, BUN 52, creatinine 2.6, proBNP 132, troponin<0.010, WBC 3.5, hemoglobin 11.5.  UA-not suggestive of infection. Patient had CT head, CT C-spine, CT facial bones, CT abdomen/pelvis-no acute process. Patient was sent to [facility] for further evaluation and management.     Admitted as:  Jacinto Amin Jose Garcia is a 80 y.o. male with pmh of hypertension, hyperlipidemia, chronic kidney disease stage III and chronic alcohol use who presents with a fall (which could be syncope) and hypoxia with shortness of breath on exertion.     1. Syncope/fall: Possibly due to orthostatic hypotension from volume depletion (mowed lawn all day in a hot summer weather). CT head, facial bone, C-spine and abdomen/pelvis-no acute findings. Troponin was negative. Echo-EF is 50%, no significant valvular abnormality. Cardiology consulted-stress test was abnormal revealing reversible defect in the inferior wall. However, cardiac catheterization showed no significant stenosis.       2. Acute on chronic kidney disease stage III: Due to dehydration. Initially held lisinopril/HCTZ. Improved with IV fluid hydration. Nephrology consulted.     3. Chronic alcohol use: CIWA protocol. No evidence of alcohol withdrawal.    4.  Hypertension: Resumed lisinopril/HCTZ prior to discharge. 5.  Hyperlipidemia: It is not clear why patient is taking Lopid and Tricor together. He will follow-up with primary care physician.     Chronic problems include hyperlipidemia and obesity. The patient expressed appropriate understanding of, and agreement with the discharge recommendations, medications, and plan.      Consults this admission:  IP CONSULT TO NEPHROLOGY  IP CONSULT TO CARDIOLOGY  IP CONSULT TO CARDIAC REHAB    Discharge Diagnosis:   Abnormal nuclear stress test    Syncope with a fall  Acute on chronic kidney disease stage III  Chronic alcohol use  Hypertension    Discharge Instruction:   Follow up appointments: PCP and cardiology  Primary care physician: KELI De Los Santos CNP within 2 weeks  Diet: regular diet   Activity: activity as tolerated  Disposition: Discharged to:   [x]Home, [][facility], []SNF, []Acute Rehab, []Hospice   Condition on discharge: Stable  Labs and Tests to be Followed up as an outpatient by PCP or Specialist: None    Discharge Medications:        Medication List      CONTINUE taking these medications    acetaminophen 500 MG tablet  Commonly known as: TYLENOL     aspirin 81 MG chewable tablet  Take 1 tablet by mouth daily     citalopram 20 MG tablet  Commonly known as: CELEXA     CoQ-10 50 MG Caps     esomeprazole Magnesium 20 MG Pack  Commonly known as: NEXIUM     fenofibrate 54 MG tablet  Commonly known as: TRICOR     fish oil 1000 MG Caps     gemfibrozil 600 MG tablet  Commonly known as: LOPID     lisinopril-hydroCHLOROthiazide 20-12.5 MG per tablet  Commonly known as: PRINZIDE;ZESTORETIC        STOP taking these medications    simvastatin 20 MG tablet  Commonly known as: ZOCOR           Objective Findings at Discharge:   /88   Pulse 78   Temp 97.9 °F (36.6 °C) (Oral)   Resp 16   Ht 5' 10\" (1.778 m)   Wt 210 lb (95.3 kg)   SpO2 95%   BMI 30.13 kg/m²     Wife and daughter at bedside. He is feeling great. No dizziness or weakness. According to him, his primary physician wants him to take both Lopid and Tricor together. Physical Exam:   General: No apparent respiratory distress. Eyes: EOMI. No pallor. Anicteric. ENT: neck supple  Cardiovascular: Regular rate. No murmur or S3. Respiratory: Clear to auscultation  Gastrointestinal: Soft, non tender, no palpable mass. Genitourinary: no suprapubic tenderness  Musculoskeletal: No edema  Skin: warm, dry  Neuro: Alert. Oriented x3. No gross focal weakness. Psych: Mood appropriate.          Labs and Imaging   Echocardiogram complete 2D with doppler with color    Result Date: 5/13/2022  Transthoracic Echocardiography Report (TTE)  Demographics   Patient Name       Leo FARMER      Date of Study       05/13/2022   Date of Birth      1940         Gender              Male   Age                80 year(s)         Race                   Patient Number     [id number]         Room Number         0954   Visit Number       [id number] Corporate ID       [id number]   Accession Number   [id number]         [facility]   Ordering Physician Epi Mukherjee MD        Physician           Aldo CHAMBERLAIN  Procedure Type of Study   TTE procedure:ECHOCARDIOGRAM COMPLETE 2D W DOPPLER W COLOR. Procedure Date Date: 05/13/2022 Start: 08:25 AM Study Location: Portable Technical Quality: Adequate visualization Indications:Syncope. Patient Status: Routine Height: 70 inches Weight: 210 pounds BSA: 2.13 m2 BMI: 30.13 kg/m2 HR: 61 bpm BP: 134/77 mmHg  Conclusions   Summary  Left ventricular systolic function is lnormal.  Ejection fraction is visually estimated at 50%. Septal bounce noted. Mild left ventricular hypertrophy. Sclerotic, but non-stenotic aortic valve. Trace aortic regurgitation noted. No evidence of any pericardial effusion. Signature   ------------------------------------------------------------------  Electronically signed by Whitney Bell MD  (Interpreting physician) on 05/13/2022 at 10:59 AM  ------------------------------------------------------------------   Findings   Left Ventricle  Left ventricular systolic function is normal.  Ejection fraction is visually estimated at 50%. Left ventricle size is normal.  Septal bounce noted. Mild left ventricular hypertrophy. Indeterminate diastolic function; E/A flow reversal is noted. Left Atrium  Essentially normal left atrium. Right Atrium  Essentially normal right atrium. Right Ventricle  Essentially normal right ventricle. Aortic Valve  Sclerotic, but non-stenotic aortic valve. Trace aortic regurgitation noted. Mitral Valve  Trace mitral regurgitation. Tricuspid Valve  Trace tricuspid regurgitation; RVSP: 28 mmHg. Pulmonic Valve  Trace pulmonic regurgitation present.    Pericardial Effusion  No evidence of any pericardial effusion. Pleural Effusion  No evidence of pleural effusion. Miscellaneous  The aorta is within normal limits.   M-Mode/2D Measurements & Calculations   LV Diastolic Dimension:  LV Systolic Dimension:  LA Dimension: 3 cmAO Root  5.26 cm                  3.83 cm                 Dimension: 3.7 cmLA Area:  LV FS:27.2 %             LV Volume Diastolic: 91 77.5 cm2  LV PW Diastolic: 6.73 cm ml  LV PW Systolic: 3.52 cm  LV Volume Systolic: 42  Septum Diastolic: 3.71   ml  cm                       LV EDV/LV EDV Index: 91  Septum Systolic: 1.4 cm  GX/10 W7VY ESV/LV ESV   LA/Aorta: 0.81  CO: 5.15 l/min           Index: 42 ml/20 m2  CI: 2.42 l/m*m2          EF Calculated (A4C):    LA volume/Index: 45 ml                           53.9 %                  /81S0  LV Area Diastolic: 59.6  EF Calculated (2D):  cm2                      52.6 %  LV Area Systolic: 67.6  cm2                      LV Length: 7.43 cm                            LVOT: 2.2 cm  Doppler Measurements & Calculations   MV Peak E-Wave: 57.6  AV Peak Velocity: 142 cm/s   LVOT Peak Velocity: 91.2  cm/s                  AV Peak Gradient: 8.07 mmHg  cm/s  MV Peak A-Wave: 73.4  AV Mean Velocity: 98.9 cm/s  LVOT Mean Velocity: 72.7  cm/s                  AV Mean Gradient: 4 mmHg     cm/s  MV E/A Ratio: 0.78    AV VTI: 30.5 cm              LVOT Peak Gradient: 3  MV Peak Gradient:     AV Area (Continuity):2.77    mmHgLVOT Mean Gradient: 2  1.33 mmHg             cm2                          mmHg                                                     Estimated RVSP: 28 mmHg  MV P1/2t: 53 msec     LVOT VTI: 22.2 cm            Estimated RAP:3 mmHg  MVA by PHT:4.15 cm2                        Estimated PASP: 28.4 mmHg  MV E' Septal                                       TR Velocity:252 cm/s  Velocity: 9.43 cm/s                                TR Gradient:25.4 mmHg  MV E' Lateral  Velocity: 13 cm/s  MV E/E' septal: 6.11  MV E/E' lateral: 4.43      CT ABDOMEN PELVIS WO CONTRAST Additional Contrast? None    Result Date: 5/13/2022  EXAMINATION: CT OF THE ABDOMEN AND PELVIS WITHOUT CONTRAST 5/12/2022 11:00 pm TECHNIQUE: CT of the abdomen and pelvis was performed without the administration of intravenous contrast. Multiplanar reformatted images are provided for review. Automated exposure control, iterative reconstruction, and/or weight based adjustment of the mA/kV was utilized to reduce the radiation dose to as low as reasonably achievable. COMPARISON: None. HISTORY: ORDERING SYSTEM PROVIDED HISTORY: ATUL TECHNOLOGIST PROVIDED HISTORY: Reason for exam:->ATUL Additional Contrast?->None Decision Support Exception - unselect if not a suspected or confirmed emergency medical condition->Emergency Medical Condition (MA) Reason for Exam: fall FINDINGS: Lower Chest: Lung bases are clear Organs: Liver, spleen, adrenal glands, kidneys, pancreas. No hydronephrosis. Gallbladder is absent. GI/Bowel: Retained stool without obstruction. Colonic diverticulosis. Pelvis: Bladder and prostate unremarkable. Peritoneum/Retroperitoneum: No free air or free fluid. Aortic vascular calcifications. Aorta not aneurysmal Bones/Soft Tissues: Multilevel degenerate changes     Negative for acute inflammatory process or bowel obstruction. Negative for nephrolithiasis or obstructive uropathy Moderate colonic diverticulosis     CT HEAD WO CONTRAST    Result Date: 5/12/2022  EXAMINATION: CT OF THE HEAD WITHOUT CONTRAST  5/12/2022 10:58 pm TECHNIQUE: CT of the head was performed without the administration of intravenous contrast. Automated exposure control, iterative reconstruction, and/or weight based adjustment of the mA/kV was utilized to reduce the radiation dose to as low as reasonably achievable. COMPARISON: None. HISTORY: ORDERING SYSTEM PROVIDED HISTORY: fall TECHNOLOGIST PROVIDED HISTORY: Reason for exam:->fall Has a \"code stroke\" or \"stroke alert\" been called? ->No Decision Support Exception - unselect if not a suspected or confirmed emergency medical condition->Emergency Medical Condition (MA) Reason for Exam: fall FINDINGS: BRAIN/VENTRICLES: There is no acute intracranial hemorrhage, mass effect or midline shift. No abnormal extra-axial fluid collection. The gray-white differentiation is maintained without evidence of an acute infarct. There is no evidence of hydrocephalus. ORBITS: The visualized portion of the orbits demonstrate no acute abnormality. SINUSES: The visualized paranasal sinuses and mastoid air cells demonstrate no acute abnormality. SOFT TISSUES/SKULL:  No acute abnormality of the visualized skull or soft tissues. No acute intracranial abnormality. CT FACIAL BONES WO CONTRAST    Result Date: 5/13/2022  EXAMINATION: CT OF THE FACE WITHOUT CONTRAST  5/12/2022 10:58 pm TECHNIQUE: CT of the face was performed without the administration of intravenous contrast. Multiplanar reformatted images are provided for review. Automated exposure control, iterative reconstruction, and/or weight based adjustment of the mA/kV was utilized to reduce the radiation dose to as low as reasonably achievable. COMPARISON: None HISTORY: ORDERING SYSTEM PROVIDED HISTORY: fall TECHNOLOGIST PROVIDED HISTORY: Reason for exam:->fall Decision Support Exception - unselect if not a suspected or confirmed emergency medical condition->Emergency Medical Condition (MA) Reason for Exam: fall FINDINGS: FACIAL BONES: The frontal sinuses, orbital walls, maxilla, pterygoid plates, zygomatic arches, hard palate, nasal bones and mandible are intact. The temporomandibular joints are aligned. ORBITAL CONTENTS: The globes appear intact. The extraocular muscles, optic nerve sheath complexes and lacrimal glands appear unremarkable. No retrobulbar hematoma or mass is seen. SINUSES: There is no evidence of acute sinusitis, such as air fluid level. The mastoid air cells are clear.  SOFT TISSUES: No superficial facial soft tissue swelling is seen.     No acute facial bone trauma. CT CERVICAL SPINE WO CONTRAST    Result Date: 5/13/2022  EXAMINATION: CT OF THE CERVICAL SPINE WITHOUT CONTRAST 5/12/2022 10:58 pm TECHNIQUE: CT of the cervical spine was performed without the administration of intravenous contrast. Multiplanar reformatted images are provided for review. Automated exposure control, iterative reconstruction, and/or weight based adjustment of the mA/kV was utilized to reduce the radiation dose to as low as reasonably achievable. COMPARISON: None. HISTORY: ORDERING SYSTEM PROVIDED HISTORY: fall TECHNOLOGIST PROVIDED HISTORY: Reason for exam:->fall Decision Support Exception - unselect if not a suspected or confirmed emergency medical condition->Emergency Medical Condition (MA) Reason for Exam: fall FINDINGS: BONES/ALIGNMENT: There is no acute fracture or traumatic malalignment. DEGENERATIVE CHANGES: Multilevel degenerative changes are seen. SOFT TISSUES: There is no prevertebral soft tissue swelling. No acute abnormality of the cervical spine. XR CHEST PORTABLE    Result Date: 5/12/2022  EXAMINATION: ONE XRAY VIEW OF THE CHEST 5/12/2022 9:47 pm COMPARISON: 07/01/2018 HISTORY: ORDERING SYSTEM PROVIDED HISTORY: sob TECHNOLOGIST PROVIDED HISTORY: Reason for exam:->sob Reason for Exam: sob FINDINGS: Cardiac and mediastinal silhouettes appear within normal limits for size. Pulmonary vascularity is normal.  No focal infiltrate or pleural effusion. No pneumothorax. No acute osseous abnormality. Degenerative changes in the shoulders and spine. Clear chest without acute cardiopulmonary process.      NM MYOCARDIAL SPECT REST EXERCISE OR RX    Result Date: 5/16/2022  Cardiac Perfusion Imaging   Demographics   Patient Name      Cachorro FARMER      Date of study        05/16/2022   Date of Birth     1940         Gender               Male   Age               80 year(s)         Race                    Patient Number    [id number] Room Number          3693   Visit Number      [id number]          Height               70 inches   Corporate ID      [id number]           Weight               210 pounds   Accession Number  [id number]                                        NM Technologist      Beverlie Brittle  Physician         Aldo CHAMBERLAIN        Cardiologist         Sol Ravi MD   Conclusions   Summary  ABNORMNAL STRESS TEST, DEPRSSED LVEF, INCREASED EDV, Myocardial perfusion  scan shows MODERATE size, severe intensity, non reversible perfusion defect  in inferior wall. Recommendation  Cardiac catheterization. Signatures   ------------------------------------------------------------------  Electronically signed by Wallace Romo MD  (Interpreting cardiologist) on 05/16/2022 at 12:52  ------------------------------------------------------------------  Procedure Procedure Type:   Nuclear Stress Test:Pharmacological, Myocardial Perfusion Imaging with  Pharm, NM MYOCARDIAL SPECT REST EXERCISE OR RX  Indications: Syncope. Risk Factors   The patient risk factors include:obesity, hypercholesterolemia and  hypertension. Stress Protocols   Resting ECG  Normal sinus rhythm. Resting HR:71 bpm  Resting BP:138/67 mmHg  Stress Protocol:Pharmacologic - Lexiscan  Peak HR:91 bpm                               HR/BP product:11111  Peak BP:138/67 mmHg  Predicted HR: 141 bpm  % of predicted HR: 65   Exercise duration: 01:16 min  Reason for termination:Completed   ECG Findings  NSR   Symptoms  No symptoms with stress. Stress Interpretation  ECG portion of stress test is negative for ischemia by diagnostic criteria. Procedure Medications   - Lexiscan I.V. bolus (over 15sec.) 0.4 mg admininstered @ 05/16/2022 09:50.   Imaging Protocols   Rest                             Stress   Isotope:Sestamibi 99mTc Isotope: Sestamibi 99mTc  Isotope dose:10.7 mCi            Isotope dose:32.1 mCi  Administration route: I.V. Administration route: I.V. Injection Date:05/16/2022 08:10  Injection Date:05/16/2022 09:50  Scan Date:05/16/2022 08:55       Scan Date:05/16/2022 10:35   Technique:        SPECT          Technique:        Gated                                                     SPECT   Procedure Description   Upon patient arrival, the patient is identified using two identifiers and  the physician order is verified. An IV is established and 8-11mCi of 99mTc  Sestamibi is intravenously injected and followed with 10mL 0.9% Normal  Saline flush. A circulation period of 45 minutes occurs prior to resting  SPECT imaging. After imaging is complete the patient is escorted to the  stress lab. The patient is connected to the ECG and blood pressure is  measured. The RN starts the stress portion of the exam and rapidly  intravenously injects Lexiscan (regadenosine) 0.4mg over a period of 10 to15  seconds and follows with 5mL 0.9% Normal Saline flush. Immediately following  the Nuclear Technologist intravenously injects 22-33mCi of 99mTc Sestamibi  and 5mL 0.9% Normal Saline flush. After completion, recovery, and removal of  the IV, the patient rests during the second circulation period of 45  minutes. Final stress SPECT gated imaging is performed. The patient may  return home or to their room after stress imaging. The images are processed  and final charting is completed and sent to the appropriate cardiologist for  interpretation and reporting. Perfusion Interpretation   Myocardial perfusion scan shows small size, moderate intensity, reversible  perfusion defect in inferior wall.   Imaging Results    Summed scores     - Summed stress score: 6     - Summed rest score: 1     - Summed difference score:    5   Rest ejection  Ejection fraction:45 %  EDV :102 ml  ESV :56 ml  Stroke volume :46 ml  Medical History   Accession#: [id number]  Admission Data Admission date: 05/13/2022 Admission Time: 05:03 Hospital Status: Inpatient. CBC:   Recent Labs     05/17/22  0541   WBC 2.9*   HGB 11.8*        BMP:    Recent Labs     05/16/22  1828 05/17/22  0541    138   K 4.4 4.2   CL 99 104   CO2 20* 20*   BUN 28* 26*   CREATININE 1.2 1.1   GLUCOSE 120* 106*     Hepatic: No results for input(s): AST, ALT, ALB, BILITOT, ALKPHOS in the last 72 hours.   Lipids:   Lab Results   Component Value Date    CHOL 189 05/25/2019    HDL 22 05/13/2022    TRIG 216 05/25/2019     Hemoglobin A1C:   Lab Results   Component Value Date    LABA1C 5.7 01/30/2020     Troponin:   Lab Results   Component Value Date    TROPONINT <0.010 05/13/2022    TROPONINT <0.010 05/12/2022    TROPONINT <0.010 07/01/2018     UA:  Lab Results   Component Value Date    NITRU NEGATIVE 05/12/2022    COLORU YELLOW 05/12/2022    WBCUA NEGATIVE 05/12/2022    RBCUA NEGATIVE 05/12/2022    MUCUS 1+ 06/26/2013    BACTERIA FEW 05/12/2022    CLARITYU SL HAZY 05/12/2022    SPECGRAV 1.025 05/12/2022    LEUKOCYTESUR NEGATIVE 05/12/2022    UROBILINOGEN 0.2 05/12/2022    BILIRUBINUR NEGATIVE 05/12/2022    BLOODU NEGATIVE 05/12/2022    KETUA NEGATIVE 05/12/2022       Time Spent Discharging patient 35 minutes    Electronically signed by Van Wheat MD on 5/17/2022 at 3:13 PM

## 2022-05-17 NOTE — PROGRESS NOTES
Nephrology Progress Note  5/17/2022 12:13 PM        Subjective:   Admit Date: 5/13/2022  PCP: Graciela Hernandez, KELI - CNP    Interval History:  patient seen in early morning, this is a late entry   by the time I am writing the note, he has been discharged, which was the plan anyway   he underwent heart cath yesterday    Diet:  eating well    ROS:   no chest pain or shortness of breath   he does not have any obstructive coronary artery lesion,    Data:     Current meds:    sodium chloride flush  5-40 mL IntraVENous 2 times per day    heparin (porcine)  5,000 Units SubCUTAneous TID    aspirin  81 mg Oral Daily    citalopram  20 mg Oral Nightly    coenzyme Q10  100 mg Oral Nightly    [Held by provider] fenofibrate  54 mg Oral Daily    gemfibrozil  600 mg Oral BID AC    omega-3 acid ethyl esters  2,000 mg Oral BID    thiamine  100 mg Oral Daily    pantoprazole  40 mg Oral Nightly      sodium chloride           I/O last 3 completed shifts:   In: 240 [P.O.:240]  Out: -     CBC:   Recent Labs     05/17/22  0541   WBC 2.9*   HGB 11.8*             Recent Labs     05/16/22  1828 05/17/22  0541    138   K 4.4 4.2   CL 99 104   CO2 20* 20*   BUN 28* 26*   CREATININE 1.2 1.1   GLUCOSE 120* 106*       Lab Results   Component Value Date    CALCIUM 9.5 05/17/2022    PHOS 3.4 05/14/2022       Objective:     Vitals: /88   Pulse 78   Temp 97.9 °F (36.6 °C) (Oral)   Resp 16   Ht 5' 10\" (1.778 m)   Wt 210 lb (95.3 kg)   SpO2 95%   BMI 30.13 kg/m²     General appearance:   alert, awake and oriented this morning  HEENT:   mild conjunctival pallor, he is a dentulous  Neck:   supple  Lungs:   no crackles on auscultation  Heart:   regular rate and rhythm  Abdomen:  soft, nontender  Extremities:   no edema      Problem List :         Impression :     1.  acute kidney injury- recovering may had toxic and ischemic tubular injury  2.  dyspnea on exertion- does not have significant coronary artery disease, may have other etiology for his symptoms which can be work-up as an outpatient  3.  he does have hypertension and dyslipidemia    Recommendation/Plan  :     1.  I had a long talk with him  2.  advised for low-salt, DASH diet, abstain from alcohol, 30 minutes regular exercise  3.  he can resume his home medication- specially for blood pressure and high cholesterol  4.  we will do CMP, magnesium, phosphorus in 1 week  5.  follow-up with me in 2 weeks      Aldair Brown MD MD

## 2022-05-17 NOTE — DISCHARGE INSTR - DIET

## 2022-08-01 ENCOUNTER — HOSPITAL ENCOUNTER (OUTPATIENT)
Age: 82
Discharge: HOME | End: 2022-08-01
Payer: MEDICARE

## 2022-08-01 LAB
ALT SERPL-CCNC: 19 U/L (ref 10–40)
ANION GAP SERPL CALCULATED.3IONS-SCNC: 13 MMOL/L (ref 4–16)
BASOPHILS ABSOLUTE: 0.1 K/CU MM
BASOPHILS RELATIVE PERCENT: 1.7 % (ref 0–1)
BUN BLDV-MCNC: 29 MG/DL (ref 6–23)
CALCIUM SERPL-MCNC: 9.5 MG/DL (ref 8.3–10.6)
CHLORIDE BLD-SCNC: 110 MMOL/L (ref 99–110)
CHOLESTEROL, FASTING: 221 MG/DL
CO2: 19 MMOL/L (ref 21–32)
CREAT SERPL-MCNC: 1 MG/DL (ref 0.9–1.3)
DIFFERENTIAL TYPE: ABNORMAL
EOSINOPHILS ABSOLUTE: 0.2 K/CU MM
EOSINOPHILS RELATIVE PERCENT: 3.4 % (ref 0–3)
GFR AFRICAN AMERICAN: >60 ML/MIN/1.73M2
GFR NON-AFRICAN AMERICAN: >60 ML/MIN/1.73M2
GLUCOSE FASTING: 102 MG/DL (ref 70–99)
HCT VFR BLD CALC: 39.6 % (ref 42–52)
HDLC SERPL-MCNC: 30 MG/DL
HEMOGLOBIN: 12.6 GM/DL (ref 13.5–18)
IMMATURE NEUTROPHIL %: 0.4 % (ref 0–0.43)
LDL CHOLESTEROL CALCULATED: 143 MG/DL
LYMPHOCYTES ABSOLUTE: 1.4 K/CU MM
LYMPHOCYTES RELATIVE PERCENT: 29.9 % (ref 24–44)
MCH RBC QN AUTO: 28.1 PG (ref 27–31)
MCHC RBC AUTO-ENTMCNC: 31.8 % (ref 32–36)
MCV RBC AUTO: 88.4 FL (ref 78–100)
MONOCYTES ABSOLUTE: 0.5 K/CU MM
MONOCYTES RELATIVE PERCENT: 9.9 % (ref 0–4)
PDW BLD-RTO: 13.2 % (ref 11.7–14.9)
PLATELET # BLD: 305 K/CU MM (ref 140–440)
PMV BLD AUTO: 9.2 FL (ref 7.5–11.1)
POTASSIUM SERPL-SCNC: 4.3 MMOL/L (ref 3.5–5.1)
PROSTATE SPECIFIC ANTIGEN: 1.15 NG/ML (ref 0–4)
RBC # BLD: 4.48 M/CU MM (ref 4.6–6.2)
SEGMENTED NEUTROPHILS ABSOLUTE COUNT: 2.6 K/CU MM
SEGMENTED NEUTROPHILS RELATIVE PERCENT: 54.7 % (ref 36–66)
SODIUM BLD-SCNC: 142 MMOL/L (ref 135–145)
TOTAL IMMATURE NEUTOROPHIL: 0.02 K/CU MM
TRIGLYCERIDE, FASTING: 240 MG/DL
TSH HIGH SENSITIVITY: 0.24 UIU/ML (ref 0.27–4.2)
WBC # BLD: 4.8 K/CU MM (ref 4–10.5)

## 2022-08-01 PROCEDURE — 84460 ALANINE AMINO (ALT) (SGPT): CPT

## 2022-08-01 PROCEDURE — G0103 PSA SCREENING: HCPCS

## 2022-08-01 PROCEDURE — 80061 LIPID PANEL: CPT

## 2022-08-01 PROCEDURE — 84443 ASSAY THYROID STIM HORMONE: CPT

## 2022-08-01 PROCEDURE — 84439 ASSAY OF FREE THYROXINE: CPT

## 2022-08-01 PROCEDURE — 85025 COMPLETE CBC W/AUTO DIFF WBC: CPT

## 2022-08-01 PROCEDURE — 80048 BASIC METABOLIC PNL TOTAL CA: CPT

## 2022-08-01 PROCEDURE — 36415 COLL VENOUS BLD VENIPUNCTURE: CPT

## 2022-08-02 LAB — T4 FREE: 1.09 NG/DL (ref 0.9–1.8)

## 2023-04-04 ENCOUNTER — HOSPITAL ENCOUNTER (OUTPATIENT)
Age: 83
Discharge: HOME | End: 2023-04-04
Payer: MEDICARE

## 2023-04-04 LAB
ALT SERPL-CCNC: 24 U/L (ref 10–40)
ANION GAP SERPL CALCULATED.3IONS-SCNC: 12 MMOL/L (ref 4–16)
BASOPHILS ABSOLUTE: 0.1 K/CU MM
BASOPHILS RELATIVE PERCENT: 1.7 % (ref 0–1)
BUN SERPL-MCNC: 32 MG/DL (ref 6–23)
CALCIUM SERPL-MCNC: 9.9 MG/DL (ref 8.3–10.6)
CHLORIDE BLD-SCNC: 108 MMOL/L (ref 99–110)
CHOLEST SERPL-MCNC: 266 MG/DL
CO2: 22 MMOL/L (ref 21–32)
CREAT SERPL-MCNC: 1 MG/DL (ref 0.9–1.3)
DIFFERENTIAL TYPE: ABNORMAL
EOSINOPHILS ABSOLUTE: 0.2 K/CU MM
EOSINOPHILS RELATIVE PERCENT: 3.8 % (ref 0–3)
GFR SERPL CREATININE-BSD FRML MDRD: >60 ML/MIN/1.73M2
GLUCOSE SERPL-MCNC: 102 MG/DL (ref 70–99)
HCT VFR BLD CALC: 43.9 % (ref 42–52)
HDLC SERPL-MCNC: 26 MG/DL
HEMOGLOBIN: 13.7 GM/DL (ref 13.5–18)
IMMATURE NEUTROPHIL %: 0.5 % (ref 0–0.43)
LDLC SERPL CALC-MCNC: 188 MG/DL
LYMPHOCYTES ABSOLUTE: 1.6 K/CU MM
LYMPHOCYTES RELATIVE PERCENT: 37.6 % (ref 24–44)
MCH RBC QN AUTO: 28.1 PG (ref 27–31)
MCHC RBC AUTO-ENTMCNC: 31.2 % (ref 32–36)
MCV RBC AUTO: 90.1 FL (ref 78–100)
MONOCYTES ABSOLUTE: 0.5 K/CU MM
MONOCYTES RELATIVE PERCENT: 12.9 % (ref 0–4)
PDW BLD-RTO: 13.4 % (ref 11.7–14.9)
PLATELET # BLD: 281 K/CU MM (ref 140–440)
PMV BLD AUTO: 9.5 FL (ref 7.5–11.1)
POTASSIUM SERPL-SCNC: 4.4 MMOL/L (ref 3.5–5.1)
PROSTATE SPECIFIC ANTIGEN: 1.09 NG/ML (ref 0–4)
RBC # BLD: 4.87 M/CU MM (ref 4.6–6.2)
SEGMENTED NEUTROPHILS ABSOLUTE COUNT: 1.8 K/CU MM
SEGMENTED NEUTROPHILS RELATIVE PERCENT: 43.5 % (ref 36–66)
SODIUM BLD-SCNC: 142 MMOL/L (ref 135–145)
TOTAL IMMATURE NEUTOROPHIL: 0.02 K/CU MM
TRIGL SERPL-MCNC: 261 MG/DL
TSH SERPL DL<=0.005 MIU/L-ACNC: 0.45 UIU/ML (ref 0.27–4.2)
WBC # BLD: 4.2 K/CU MM (ref 4–10.5)

## 2023-04-04 PROCEDURE — 80061 LIPID PANEL: CPT

## 2023-04-04 PROCEDURE — G0103 PSA SCREENING: HCPCS

## 2023-04-04 PROCEDURE — 36415 COLL VENOUS BLD VENIPUNCTURE: CPT

## 2023-04-04 PROCEDURE — 84443 ASSAY THYROID STIM HORMONE: CPT

## 2023-04-04 PROCEDURE — 85025 COMPLETE CBC W/AUTO DIFF WBC: CPT

## 2023-04-04 PROCEDURE — 80048 BASIC METABOLIC PNL TOTAL CA: CPT

## 2023-04-04 PROCEDURE — 84460 ALANINE AMINO (ALT) (SGPT): CPT

## 2023-07-26 ENCOUNTER — HOSPITAL ENCOUNTER (OUTPATIENT)
Age: 83
Discharge: HOME | End: 2023-07-26
Payer: MEDICARE

## 2023-07-26 LAB
CHOLEST SERPL-MCNC: 182 MG/DL
HDLC SERPL-MCNC: 32 MG/DL
LDLC SERPL CALC-MCNC: 111 MG/DL
TRIGL SERPL-MCNC: 194 MG/DL

## 2023-07-26 PROCEDURE — 80061 LIPID PANEL: CPT

## 2023-07-26 PROCEDURE — 36415 COLL VENOUS BLD VENIPUNCTURE: CPT

## 2024-03-28 ENCOUNTER — HOSPITAL ENCOUNTER (OUTPATIENT)
Age: 84
Discharge: HOME | End: 2024-03-28
Payer: MEDICARE

## 2024-03-28 LAB
25(OH)D3 SERPL-MCNC: 12.2 NG/ML
ALT SERPL-CCNC: 27 U/L (ref 10–40)
ANION GAP SERPL CALCULATED.3IONS-SCNC: 16 MMOL/L (ref 7–16)
BASOPHILS ABSOLUTE: 0.1 K/CU MM
BASOPHILS RELATIVE PERCENT: 1.3 % (ref 0–1)
BUN SERPL-MCNC: 30 MG/DL (ref 6–23)
CALCIUM SERPL-MCNC: 9.7 MG/DL (ref 8.3–10.6)
CHLORIDE BLD-SCNC: 105 MMOL/L (ref 99–110)
CHOLESTEROL, FASTING: 263 MG/DL
CO2: 20 MMOL/L (ref 21–32)
CREAT SERPL-MCNC: 1.1 MG/DL (ref 0.9–1.3)
DIFFERENTIAL TYPE: ABNORMAL
EOSINOPHILS ABSOLUTE: 0.4 K/CU MM
EOSINOPHILS RELATIVE PERCENT: 9.4 % (ref 0–3)
GFR SERPL CREATININE-BSD FRML MDRD: 67 ML/MIN/1.73M2
GLUCOSE P FAST SERPL-MCNC: 95 MG/DL (ref 70–99)
HCT VFR BLD CALC: 42.6 % (ref 42–52)
HDLC SERPL-MCNC: 26 MG/DL
HEMOGLOBIN: 13.6 GM/DL (ref 13.5–18)
IMMATURE NEUTROPHIL %: 0.4 % (ref 0–0.43)
LDLC SERPL CALC-MCNC: 191 MG/DL
LYMPHOCYTES ABSOLUTE: 1.7 K/CU MM
LYMPHOCYTES RELATIVE PERCENT: 37.9 % (ref 24–44)
MCH RBC QN AUTO: 28.2 PG (ref 27–31)
MCHC RBC AUTO-ENTMCNC: 31.9 % (ref 32–36)
MCV RBC AUTO: 88.4 FL (ref 78–100)
MONOCYTES ABSOLUTE: 0.5 K/CU MM
MONOCYTES RELATIVE PERCENT: 9.9 % (ref 0–4)
PDW BLD-RTO: 14.1 % (ref 11.7–14.9)
PLATELET # BLD: 290 K/CU MM (ref 140–440)
PMV BLD AUTO: 8.9 FL (ref 7.5–11.1)
POTASSIUM SERPL-SCNC: 4.4 MMOL/L (ref 3.5–5.1)
PROSTATE SPECIFIC ANTIGEN: 0.97 NG/ML (ref 0–4)
RBC # BLD: 4.82 M/CU MM (ref 4.6–6.2)
SEGMENTED NEUTROPHILS ABSOLUTE COUNT: 1.9 K/CU MM
SEGMENTED NEUTROPHILS RELATIVE PERCENT: 41.1 % (ref 36–66)
SODIUM BLD-SCNC: 141 MMOL/L (ref 135–145)
TOTAL IMMATURE NEUTOROPHIL: 0.02 K/CU MM
TRIGLYCERIDE, FASTING: 231 MG/DL
TSH SERPL DL<=0.005 MIU/L-ACNC: 0.35 UIU/ML (ref 0.27–4.2)
WBC # BLD: 4.6 K/CU MM (ref 4–10.5)

## 2024-03-28 PROCEDURE — 85025 COMPLETE CBC W/AUTO DIFF WBC: CPT

## 2024-03-28 PROCEDURE — 84443 ASSAY THYROID STIM HORMONE: CPT

## 2024-03-28 PROCEDURE — 80061 LIPID PANEL: CPT

## 2024-03-28 PROCEDURE — 84460 ALANINE AMINO (ALT) (SGPT): CPT

## 2024-03-28 PROCEDURE — 82306 VITAMIN D 25 HYDROXY: CPT

## 2024-03-28 PROCEDURE — G0103 PSA SCREENING: HCPCS

## 2024-03-28 PROCEDURE — 36415 COLL VENOUS BLD VENIPUNCTURE: CPT

## 2024-03-28 PROCEDURE — 80048 BASIC METABOLIC PNL TOTAL CA: CPT

## 2025-05-22 ENCOUNTER — HOSPITAL ENCOUNTER (OUTPATIENT)
Age: 85
Discharge: HOME | End: 2025-05-22
Payer: MEDICARE

## 2025-05-22 LAB
25(OH)D3 SERPL-MCNC: 17.2 NG/ML (ref 30–150)
ALT SERPL-CCNC: 28 U/L (ref 10–40)
ANION GAP SERPL CALCULATED.3IONS-SCNC: 16 MMOL/L (ref 9–17)
BASOPHILS # BLD: 0.05 K/UL
BASOPHILS NFR BLD: 2 % (ref 0–1)
BUN SERPL-MCNC: 28 MG/DL (ref 7–20)
CALCIUM SERPL-MCNC: 9.5 MG/DL (ref 8.3–10.6)
CHLORIDE SERPL-SCNC: 105 MMOL/L (ref 99–110)
CHOLEST SERPL-MCNC: 220 MG/DL (ref 125–199)
CO2 SERPL-SCNC: 18 MMOL/L (ref 21–32)
CREAT SERPL-MCNC: 0.9 MG/DL (ref 0.8–1.3)
EOSINOPHIL # BLD: 0.24 K/UL
EOSINOPHILS RELATIVE PERCENT: 7 % (ref 0–3)
ERYTHROCYTE [DISTWIDTH] IN BLOOD BY AUTOMATED COUNT: 14.9 % (ref 11.7–14.9)
GFR, ESTIMATED: 83 ML/MIN/1.73M2
GLUCOSE SERPL-MCNC: 109 MG/DL (ref 74–99)
HCT VFR BLD AUTO: 40.5 % (ref 42–52)
HDLC SERPL-MCNC: 22 MG/DL
HGB BLD-MCNC: 13.2 G/DL (ref 13.5–18)
IMM GRANULOCYTES # BLD AUTO: 0.02 K/UL
IMM GRANULOCYTES NFR BLD: 1 %
LDLC SERPL CALC-MCNC: 154 MG/DL
LYMPHOCYTES NFR BLD: 1.15 K/UL
LYMPHOCYTES RELATIVE PERCENT: 35 % (ref 24–44)
MCH RBC QN AUTO: 28.5 PG (ref 27–31)
MCHC RBC AUTO-ENTMCNC: 32.6 G/DL (ref 32–36)
MCV RBC AUTO: 87.5 FL (ref 78–100)
MONOCYTES NFR BLD: 0.39 K/UL
MONOCYTES NFR BLD: 12 % (ref 0–5)
NEUTROPHILS NFR BLD: 44 % (ref 36–66)
NEUTS SEG NFR BLD: 1.44 K/UL
PLATELET # BLD AUTO: 280 K/UL (ref 140–440)
PMV BLD AUTO: 9 FL (ref 7.5–11.1)
POTASSIUM SERPL-SCNC: 4.3 MMOL/L (ref 3.5–5.1)
PSA SERPL-MCNC: 1.02 NG/ML (ref 0–4)
RBC # BLD AUTO: 4.63 M/UL (ref 4.6–6.2)
SODIUM SERPL-SCNC: 138 MMOL/L (ref 136–145)
TRIGL SERPL-MCNC: 219 MG/DL
TSH SERPL DL<=0.05 MIU/L-ACNC: 0.26 UIU/ML (ref 0.27–4.2)
WBC OTHER # BLD: 3.3 K/UL (ref 4–10.5)

## 2025-05-22 PROCEDURE — 80061 LIPID PANEL: CPT

## 2025-05-22 PROCEDURE — 84460 ALANINE AMINO (ALT) (SGPT): CPT

## 2025-05-22 PROCEDURE — 84443 ASSAY THYROID STIM HORMONE: CPT

## 2025-05-22 PROCEDURE — G0103 PSA SCREENING: HCPCS

## 2025-05-22 PROCEDURE — 36415 COLL VENOUS BLD VENIPUNCTURE: CPT

## 2025-05-22 PROCEDURE — 80048 BASIC METABOLIC PNL TOTAL CA: CPT

## 2025-05-22 PROCEDURE — 82306 VITAMIN D 25 HYDROXY: CPT

## 2025-05-22 PROCEDURE — 85025 COMPLETE CBC W/AUTO DIFF WBC: CPT

## 2025-05-27 ENCOUNTER — HOSPITAL ENCOUNTER (OUTPATIENT)
Age: 85
Discharge: HOME | End: 2025-05-27
Payer: MEDICARE

## 2025-05-27 LAB
BASOPHILS # BLD: 0.05 K/UL
BASOPHILS NFR BLD: 2 % (ref 0–1)
EOSINOPHIL # BLD: 0.17 K/UL
EOSINOPHILS RELATIVE PERCENT: 5 % (ref 0–3)
ERYTHROCYTE [DISTWIDTH] IN BLOOD BY AUTOMATED COUNT: 14.8 % (ref 11.7–14.9)
HCT VFR BLD AUTO: 39.4 % (ref 42–52)
HGB BLD-MCNC: 12.4 G/DL (ref 13.5–18)
IMM GRANULOCYTES # BLD AUTO: 0.02 K/UL
IMM GRANULOCYTES NFR BLD: 1 %
LYMPHOCYTES NFR BLD: 0.98 K/UL
LYMPHOCYTES RELATIVE PERCENT: 29 % (ref 24–44)
MCH RBC QN AUTO: 27.9 PG (ref 27–31)
MCHC RBC AUTO-ENTMCNC: 31.5 G/DL (ref 32–36)
MCV RBC AUTO: 88.7 FL (ref 78–100)
MONOCYTES NFR BLD: 0.48 K/UL
MONOCYTES NFR BLD: 14 % (ref 0–5)
NEUTROPHILS NFR BLD: 49 % (ref 36–66)
NEUTS SEG NFR BLD: 1.63 K/UL
PLATELET # BLD AUTO: 262 K/UL (ref 140–440)
PMV BLD AUTO: 8.9 FL (ref 7.5–11.1)
RBC # BLD AUTO: 4.44 M/UL (ref 4.6–6.2)
WBC OTHER # BLD: 3.3 K/UL (ref 4–10.5)

## 2025-05-27 PROCEDURE — 85025 COMPLETE CBC W/AUTO DIFF WBC: CPT

## 2025-05-27 PROCEDURE — 36415 COLL VENOUS BLD VENIPUNCTURE: CPT

## 2025-06-10 ENCOUNTER — INITIAL CONSULT (OUTPATIENT)
Age: 85
End: 2025-06-10
Payer: MEDICARE

## 2025-06-10 ENCOUNTER — HOSPITAL ENCOUNTER (OUTPATIENT)
Age: 85
Discharge: HOME | End: 2025-06-10
Payer: MEDICARE

## 2025-06-10 VITALS
HEIGHT: 70 IN | HEART RATE: 57 BPM | DIASTOLIC BLOOD PRESSURE: 72 MMHG | RESPIRATION RATE: 18 BRPM | OXYGEN SATURATION: 100 % | TEMPERATURE: 97.5 F | WEIGHT: 189.6 LBS | SYSTOLIC BLOOD PRESSURE: 133 MMHG | BODY MASS INDEX: 27.14 KG/M2

## 2025-06-10 DIAGNOSIS — D72.819 LEUKOPENIA, UNSPECIFIED TYPE: Primary | ICD-10-CM

## 2025-06-10 DIAGNOSIS — D64.9 ANEMIA, UNSPECIFIED TYPE: ICD-10-CM

## 2025-06-10 DIAGNOSIS — D72.819 LEUKOPENIA, UNSPECIFIED TYPE: ICD-10-CM

## 2025-06-10 LAB
ALBUMIN SERPL-MCNC: 4.7 G/DL (ref 3.4–5)
ALBUMIN/GLOB SERPL: 1.9 {RATIO}
ALP SERPL-CCNC: 48 U/L (ref 40–129)
ALT SERPL-CCNC: 33 U/L (ref 10–40)
ANION GAP SERPL CALCULATED.3IONS-SCNC: 17 MMOL/L (ref 9–17)
AST SERPL-CCNC: 34 U/L (ref 15–37)
BASOPHILS # BLD: 0.05 K/UL
BASOPHILS NFR BLD: 2 % (ref 0–1)
BILIRUB SERPL-MCNC: 0.3 MG/DL (ref 0–1)
BILIRUB UR QL STRIP: NEGATIVE
BUN SERPL-MCNC: 24 MG/DL (ref 7–20)
CALCIUM SERPL-MCNC: 9.9 MG/DL (ref 8.3–10.6)
CHLORIDE SERPL-SCNC: 105 MMOL/L (ref 99–110)
CLARITY UR: CLEAR
CO2 SERPL-SCNC: 20 MMOL/L (ref 21–32)
COLOR UR: YELLOW
COMMENT: NORMAL
CREAT SERPL-MCNC: 1 MG/DL (ref 0.8–1.3)
EOSINOPHIL # BLD: 0.15 K/UL
EOSINOPHILS RELATIVE PERCENT: 5 % (ref 0–3)
ERYTHROCYTE [DISTWIDTH] IN BLOOD BY AUTOMATED COUNT: 14.4 % (ref 11.7–14.9)
ERYTHROCYTE [SEDIMENTATION RATE] IN BLOOD BY WESTERGREN METHOD: 2 MM/HR (ref 0–20)
FOLATE SERPL-MCNC: 6.2 NG/ML (ref 4.8–24.2)
GFR, ESTIMATED: 77 ML/MIN/1.73M2
GLUCOSE SERPL-MCNC: 108 MG/DL (ref 74–99)
GLUCOSE UR STRIP-MCNC: NEGATIVE MG/DL
HAPTOGLOB SERPL-MCNC: 97 MG/DL (ref 30–200)
HCT VFR BLD AUTO: 42.6 % (ref 42–52)
HGB BLD-MCNC: 13.5 G/DL (ref 13.5–18)
HGB UR QL STRIP.AUTO: NEGATIVE
IMM GRANULOCYTES # BLD AUTO: 0.02 K/UL
IMM GRANULOCYTES NFR BLD: 1 %
KETONES UR STRIP-MCNC: NEGATIVE MG/DL
LDH SERPL-CCNC: 160 U/L (ref 100–190)
LEUKOCYTE ESTERASE UR QL STRIP: NEGATIVE
LYMPHOCYTES NFR BLD: 0.94 K/UL
LYMPHOCYTES RELATIVE PERCENT: 32 % (ref 24–44)
MCH RBC QN AUTO: 28.2 PG (ref 27–31)
MCHC RBC AUTO-ENTMCNC: 31.7 G/DL (ref 32–36)
MCV RBC AUTO: 88.9 FL (ref 78–100)
MONOCYTES NFR BLD: 0.33 K/UL
MONOCYTES NFR BLD: 11 % (ref 0–5)
NEUTROPHILS NFR BLD: 49 % (ref 36–66)
NEUTS SEG NFR BLD: 1.45 K/UL
NITRITE UR QL STRIP: NEGATIVE
PH UR STRIP: 6 [PH] (ref 5–8)
PLATELET # BLD AUTO: 278 K/UL (ref 140–440)
PMV BLD AUTO: 9.4 FL (ref 7.5–11.1)
POTASSIUM SERPL-SCNC: 3.9 MMOL/L (ref 3.5–5.1)
PROT SERPL-MCNC: 7.2 G/DL (ref 6.4–8.2)
PROT UR STRIP-MCNC: NEGATIVE MG/DL
RBC # BLD AUTO: 4.79 M/UL (ref 4.6–6.2)
RETICS # AUTO: 0.08 M/UL
RETICS/RBC NFR AUTO: 1.7 % (ref 0.2–2)
SODIUM SERPL-SCNC: 142 MMOL/L (ref 136–145)
SP GR UR STRIP: 1.02 (ref 1–1.03)
T3 SERPL-MCNC: 1 NG/DL (ref 80–200)
T4 SERPL-MCNC: 8.3 UG/DL (ref 4.5–10.9)
TSH SERPL DL<=0.05 MIU/L-ACNC: 0.31 UIU/ML (ref 0.27–4.2)
UROBILINOGEN UR STRIP-ACNC: 0.2 EU/DL (ref 0–1)
VIT B12 SERPL-MCNC: 282 PG/ML (ref 211–911)
WBC OTHER # BLD: 2.9 K/UL (ref 4–10.5)

## 2025-06-10 PROCEDURE — 85652 RBC SED RATE AUTOMATED: CPT

## 2025-06-10 PROCEDURE — 86039 ANTINUCLEAR ANTIBODIES (ANA): CPT

## 2025-06-10 PROCEDURE — 86431 RHEUMATOID FACTOR QUANT: CPT

## 2025-06-10 PROCEDURE — 99204 OFFICE O/P NEW MOD 45 MIN: CPT | Performed by: INTERNAL MEDICINE

## 2025-06-10 PROCEDURE — 1123F ACP DISCUSS/DSCN MKR DOCD: CPT | Performed by: INTERNAL MEDICINE

## 2025-06-10 PROCEDURE — 83615 LACTATE (LD) (LDH) ENZYME: CPT

## 2025-06-10 PROCEDURE — 84480 ASSAY TRIIODOTHYRONINE (T3): CPT

## 2025-06-10 PROCEDURE — 83010 ASSAY OF HAPTOGLOBIN QUANT: CPT

## 2025-06-10 PROCEDURE — 1036F TOBACCO NON-USER: CPT | Performed by: INTERNAL MEDICINE

## 2025-06-10 PROCEDURE — 3075F SYST BP GE 130 - 139MM HG: CPT | Performed by: INTERNAL MEDICINE

## 2025-06-10 PROCEDURE — 85025 COMPLETE CBC W/AUTO DIFF WBC: CPT

## 2025-06-10 PROCEDURE — 86038 ANTINUCLEAR ANTIBODIES: CPT

## 2025-06-10 PROCEDURE — 82607 VITAMIN B-12: CPT

## 2025-06-10 PROCEDURE — 80053 COMPREHEN METABOLIC PANEL: CPT

## 2025-06-10 PROCEDURE — 36415 COLL VENOUS BLD VENIPUNCTURE: CPT

## 2025-06-10 PROCEDURE — G8427 DOCREV CUR MEDS BY ELIG CLIN: HCPCS | Performed by: INTERNAL MEDICINE

## 2025-06-10 PROCEDURE — 81003 URINALYSIS AUTO W/O SCOPE: CPT

## 2025-06-10 PROCEDURE — 1126F AMNT PAIN NOTED NONE PRSNT: CPT | Performed by: INTERNAL MEDICINE

## 2025-06-10 PROCEDURE — 99202 OFFICE O/P NEW SF 15 MIN: CPT

## 2025-06-10 PROCEDURE — 3078F DIAST BP <80 MM HG: CPT | Performed by: INTERNAL MEDICINE

## 2025-06-10 PROCEDURE — 84436 ASSAY OF TOTAL THYROXINE: CPT

## 2025-06-10 PROCEDURE — 1159F MED LIST DOCD IN RCRD: CPT | Performed by: INTERNAL MEDICINE

## 2025-06-10 PROCEDURE — G8419 CALC BMI OUT NRM PARAM NOF/U: HCPCS | Performed by: INTERNAL MEDICINE

## 2025-06-10 PROCEDURE — 85045 AUTOMATED RETICULOCYTE COUNT: CPT

## 2025-06-10 PROCEDURE — 82746 ASSAY OF FOLIC ACID SERUM: CPT

## 2025-06-10 PROCEDURE — 84443 ASSAY THYROID STIM HORMONE: CPT

## 2025-06-10 RX ORDER — OMEPRAZOLE 20 MG/1
20 CAPSULE, DELAYED RELEASE ORAL DAILY
COMMUNITY

## 2025-06-10 RX ORDER — LISINOPRIL 20 MG/1
20 TABLET ORAL DAILY
COMMUNITY

## 2025-06-10 ASSESSMENT — PATIENT HEALTH QUESTIONNAIRE - PHQ9
SUM OF ALL RESPONSES TO PHQ QUESTIONS 1-9: 0
2. FEELING DOWN, DEPRESSED OR HOPELESS: NOT AT ALL
1. LITTLE INTEREST OR PLEASURE IN DOING THINGS: NOT AT ALL
SUM OF ALL RESPONSES TO PHQ QUESTIONS 1-9: 0

## 2025-06-10 NOTE — PROGRESS NOTES
Patient Name:  Leon Monte  Patient :  1940  Patient MRN:  [id number]     Primary Oncologist: Aretha Walls MD  Referring Provider: Cielo Robles APRN - CNP     Date of Service: 6/10/2025      Reason for Consult: Leukopenia and anemia   Chief Complaint:    Chief Complaint   Patient presents with    New Patient       Encounter Diagnoses   Name Primary?    Leukopenia, unspecified type Yes    Anemia, unspecified type         HPI:   6/10/2025, very pleasant younger appearing 84-year-old male was accompanied by his daughter and wife.  He was referred for leukopenia and anemia.  Reported slight fatigue.  Lost 5 pounds this summer as he has been active.  Otherwise denied any chest pain or increased shortness of breath.  Denied any fever or cough.  Denied any night sweats, lymphadenopathy.  Denied any abdominal pain, nausea, vomiting, diarrhea, constipation.  No  symptoms.  No lower extremity edema.  Mild knee pain.    2025 BMP with creatinine of 0.9 CBC with WBC of 3.3 hemoglobin of 13.2 hematocrit of 40.5 MCV of 87.5 and platelets of 280 ANC 1400 lymphocyte count of 1150.    2025 CBC with WBC of 3.3 hemoglobin of 12.4 hematocrit 39.4 MCV of 88.7 and platelets of 262 absolute neutrophil count of 1600 absolute lymphocyte count of 980.  Vitamin D was 17 TSH 0.26 PSA was 1.02     Past Medical History:     Hypertension                                                             Past Surgery History:    Left knee repair, appendectomy, cholecystectomy and tonsillectomy                                                                         Social History:   Lives with his wife.  Quit consuming excess alcohol about 3 years ago prior to which he consumed half a gallon of bourbon for 2 to 3 years.  No history of smoking tobacco.  No history of any illicit drug abuse.                                                                                                    Family History:    Brother son  of

## 2025-06-11 ENCOUNTER — CLINICAL DOCUMENTATION (OUTPATIENT)
Dept: ONCOLOGY | Age: 85
End: 2025-06-11

## 2025-06-11 DIAGNOSIS — E53.8 B12 DEFICIENCY: Primary | ICD-10-CM

## 2025-06-11 LAB
ANTI-NUCLEAR ANTIBODY (ANA): NEGATIVE
RHEUMATOID FACTOR: <10 IU/ML

## 2025-06-11 RX ORDER — CYANOCOBALAMIN 1000 UG/ML
1000 INJECTION, SOLUTION INTRAMUSCULAR; SUBCUTANEOUS ONCE
OUTPATIENT
Start: 2025-06-11 | End: 2025-06-11

## 2025-06-11 RX ORDER — ACETAMINOPHEN 325 MG/1
650 TABLET ORAL
OUTPATIENT
Start: 2025-06-11

## 2025-06-11 RX ORDER — ALBUTEROL SULFATE 90 UG/1
4 INHALANT RESPIRATORY (INHALATION) PRN
OUTPATIENT
Start: 2025-06-11

## 2025-06-11 RX ORDER — SODIUM CHLORIDE 9 MG/ML
INJECTION, SOLUTION INTRAVENOUS CONTINUOUS
OUTPATIENT
Start: 2025-06-11

## 2025-06-11 RX ORDER — DIPHENHYDRAMINE HYDROCHLORIDE 50 MG/ML
50 INJECTION, SOLUTION INTRAMUSCULAR; INTRAVENOUS
OUTPATIENT
Start: 2025-06-11

## 2025-06-11 RX ORDER — FAMOTIDINE 10 MG/ML
20 INJECTION, SOLUTION INTRAVENOUS
OUTPATIENT
Start: 2025-06-11

## 2025-06-11 RX ORDER — EPINEPHRINE 1 MG/ML
0.3 INJECTION, SOLUTION, CONCENTRATE INTRAVENOUS PRN
OUTPATIENT
Start: 2025-06-11

## 2025-06-11 RX ORDER — HYDROCORTISONE SODIUM SUCCINATE 100 MG/2ML
100 INJECTION INTRAMUSCULAR; INTRAVENOUS
OUTPATIENT
Start: 2025-06-11

## 2025-06-11 RX ORDER — ONDANSETRON 2 MG/ML
8 INJECTION INTRAMUSCULAR; INTRAVENOUS
OUTPATIENT
Start: 2025-06-11

## 2025-06-11 NOTE — PROGRESS NOTES
Lab results routed to PCP through [email or web address] requesting to manage thyroid. Also, order placed for monthly B12 1,000 mcg injections per physician instruction. Therapy plan added. Once medication approved, patient will be scheduled for injections and notified of appointment times.

## 2025-06-14 LAB
MISCELLANEOUS LAB TEST RESULT: ABNORMAL
TEST NAME: ABNORMAL

## 2025-06-18 LAB — SURGICAL PATHOLOGY REPORT: NORMAL

## 2025-07-15 ENCOUNTER — TELEPHONE (OUTPATIENT)
Dept: ONCOLOGY | Age: 85
End: 2025-07-15

## 2025-07-15 NOTE — TELEPHONE ENCOUNTER
Dr Walls wants patient to receive injections, called patient regarding appointment for B12 injection, patient is scheduled for 07/17 @ 1300 in [place] office, patient states understanding        Called patient to schedule B12 injections, patient states he wasn't aware that he needed these injections and said that he is taking oral B12, Vit D and Folic acid daily. Message sent to Dr Walls to see if she still wants me to schedule him for injections

## 2025-07-17 ENCOUNTER — HOSPITAL ENCOUNTER (OUTPATIENT)
Age: 85
Discharge: HOME | End: 2025-07-17
Payer: MEDICARE

## 2025-07-17 DIAGNOSIS — E53.8 B12 DEFICIENCY: Primary | ICD-10-CM

## 2025-07-17 PROCEDURE — 96372 THER/PROPH/DIAG INJ SC/IM: CPT

## 2025-07-17 PROCEDURE — 6360000002 HC RX W HCPCS

## 2025-07-17 RX ORDER — CYANOCOBALAMIN 1000 UG/ML
1000 INJECTION, SOLUTION INTRAMUSCULAR; SUBCUTANEOUS ONCE
OUTPATIENT
Start: 2025-08-14 | End: 2025-08-14

## 2025-07-17 RX ORDER — HYDROCORTISONE SODIUM SUCCINATE 100 MG/2ML
100 INJECTION INTRAMUSCULAR; INTRAVENOUS
OUTPATIENT
Start: 2025-08-14

## 2025-07-17 RX ORDER — DIPHENHYDRAMINE HYDROCHLORIDE 50 MG/ML
50 INJECTION, SOLUTION INTRAMUSCULAR; INTRAVENOUS
OUTPATIENT
Start: 2025-08-14

## 2025-07-17 RX ORDER — EPINEPHRINE 1 MG/ML
0.3 INJECTION, SOLUTION INTRAMUSCULAR; SUBCUTANEOUS PRN
OUTPATIENT
Start: 2025-08-14

## 2025-07-17 RX ORDER — ACETAMINOPHEN 325 MG/1
650 TABLET ORAL
OUTPATIENT
Start: 2025-08-14

## 2025-07-17 RX ORDER — SODIUM CHLORIDE 9 MG/ML
INJECTION, SOLUTION INTRAVENOUS CONTINUOUS
OUTPATIENT
Start: 2025-08-14

## 2025-07-17 RX ORDER — ALBUTEROL SULFATE 90 UG/1
4 INHALANT RESPIRATORY (INHALATION) PRN
OUTPATIENT
Start: 2025-08-14

## 2025-07-17 RX ORDER — CYANOCOBALAMIN 1000 UG/ML
1000 INJECTION, SOLUTION INTRAMUSCULAR; SUBCUTANEOUS ONCE
Status: COMPLETED | OUTPATIENT
Start: 2025-07-17 | End: 2025-07-17

## 2025-07-17 RX ORDER — CYANOCOBALAMIN 1000 UG/ML
INJECTION, SOLUTION INTRAMUSCULAR; SUBCUTANEOUS
Status: COMPLETED
Start: 2025-07-17 | End: 2025-07-17

## 2025-07-17 RX ORDER — ONDANSETRON 2 MG/ML
8 INJECTION INTRAMUSCULAR; INTRAVENOUS
OUTPATIENT
Start: 2025-08-14

## 2025-07-17 RX ADMIN — CYANOCOBALAMIN 1000 MCG: 1000 INJECTION, SOLUTION INTRAMUSCULAR; SUBCUTANEOUS at 13:15

## 2025-07-17 NOTE — PROGRESS NOTES
Patient arrived stable and ambulatory for B12 injection. Patient asking why he was told in OV to start B12 oral supplements but then was scheduled for B12 injections. Pt reports he has been taking B12 supplements since ov with Dr. Walls on 6/10/25.    DANIE Mccormack was informed. Per DANIE Mccormack - it wont cause harm if patient is taking B12 injections vs oral B12. Patient can have a better response with injections vs oral supplements. Patient informed of above and verbalized understanding. Patient would like to have labs checked for B12 levels prior to receiving his next injection.    B12 administered in Right deltoid per patient preference. Bandaid applied over site. Printed AVS given to patient. Patient Dc'd stable and ambulatory. Pt to RTC 8/14/25 for next monthly injection and OV on 10/7/25.

## 2025-07-21 ENCOUNTER — CLINICAL DOCUMENTATION (OUTPATIENT)
Age: 85
End: 2025-07-21

## 2025-07-21 NOTE — PROGRESS NOTES
Patient and spoke patient regarding labs which was was discussed at last injection appt. Pt request was sent to Dr. Walls that day. Per Dr. Walls, please have patient wait until September for more lab work.     Patient expressed that he is not happy with waiting until September. He would like to have B12 level checked before he receives his next injection in 8/14/25. Pt reported he didn't feel well after receiving B12 injection. He reports he didn't have much energy. Pt stated \"One day, I slept for 15 hours. Pt is attributing this to taking oral supplements prior to the injection.     Patient reports he is feeling well today but would like a message to be sent to Dr. Walls to reconsider having B12 level checked prior to receiving next B12 injection. Message sent to Dr. Walls.    Per Dr. Walls - Cancel next B12 injection as pt would like to resume oral B12 supplement. Patient to have labs in September but to reach out to our office if he is feeling worse to possibly have labs checked sooner. Pt to keep OV as scheduled as well.